# Patient Record
Sex: MALE | Race: WHITE | Employment: STUDENT | URBAN - METROPOLITAN AREA
[De-identification: names, ages, dates, MRNs, and addresses within clinical notes are randomized per-mention and may not be internally consistent; named-entity substitution may affect disease eponyms.]

---
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
## 2018-01-01 ENCOUNTER — HOSPITAL ENCOUNTER (OUTPATIENT)
Dept: OTHER | Age: 18
Discharge: HOME | End: 2018-01-31
Attending: EMERGENCY MEDICINE | Admitting: EMERGENCY MEDICINE

## 2018-01-01 ENCOUNTER — HOSPITAL ENCOUNTER (OUTPATIENT)
Dept: PHYSICAL THERAPY | Age: 18
Discharge: HOME | End: 2018-01-31
Attending: ORTHOPAEDIC SURGERY | Admitting: ORTHOPAEDIC SURGERY

## 2018-01-18 ENCOUNTER — OFFICE VISIT (OUTPATIENT)
Dept: ORTHOPEDIC SURGERY | Age: 18
End: 2018-01-18

## 2018-01-18 VITALS — HEIGHT: 71 IN | WEIGHT: 169.97 LBS | BODY MASS INDEX: 23.8 KG/M2

## 2018-01-18 DIAGNOSIS — S53.442A TEAR OF ULNAR COLLATERAL LIGAMENT OF LEFT ELBOW, INITIAL ENCOUNTER: Primary | ICD-10-CM

## 2018-01-18 PROCEDURE — G8427 DOCREV CUR MEDS BY ELIG CLIN: HCPCS | Performed by: ORTHOPAEDIC SURGERY

## 2018-01-18 PROCEDURE — 1036F TOBACCO NON-USER: CPT | Performed by: ORTHOPAEDIC SURGERY

## 2018-01-18 PROCEDURE — G8484 FLU IMMUNIZE NO ADMIN: HCPCS | Performed by: ORTHOPAEDIC SURGERY

## 2018-01-18 PROCEDURE — 99212 OFFICE O/P EST SF 10 MIN: CPT | Performed by: ORTHOPAEDIC SURGERY

## 2018-01-18 PROCEDURE — G8420 CALC BMI NORM PARAMETERS: HCPCS | Performed by: ORTHOPAEDIC SURGERY

## 2018-01-18 NOTE — PROGRESS NOTES
injury. Injuries to the left elbow including ulnar collateral ligament proximal tear with microfracture capitellum. Treatment has been conservative with hinged elbow brace followed by progressive range of motion and strengthening activities. Since his last visit, he has been out of the brace, performing light strengthening with band work and avoiding  valgus stress to his elbow while strengthening. He has been performing baseball activities including swinging a bat as well as [place] without any symptoms. He states that he had some occasional tightness in his proximal forearm after swinging a bat several times and he has been doing stretches both pre-and post-activity which have improved his symptoms. 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 Review of Systems  Pertinent items are noted in HPI  Review of systems reviewed from Patient History Form dated on 11/9/17 and available in the patient's chart under the Media tab. Vital Signs  There were no vitals filed for this visit. Physical Exam  Constitutional:  Patient is well-nourished and demonstrates normal hygiene. Mental Status:  Patient is alert and oriented to person, place and time. Skin:  Intact, no rashes or lesions. Left Elbow/left upper extremity Examination  Inspection:   no elbow swelling including medial elbow  No skin lesions or open wounds  No erythema or fluctuance  No obvious deformity of left elbow, wrist or upper extremity     Palpation:  Nontender to palpation left wrist, stable distal radioulnar joint in pronation, supination and neutral position  No tenderness to palpation flexor pronator mass and medial epicondyle.   No tenderness just anterior to medial epicondyle  No tenderness radiocapitellar joint  Nontender throughout remainder of left elbow, left forearm and left upper extremity     Range of Motion:  Smooth range of motion without crepitus left elbow 0-140° with no pain athroughout range of motion  Painless wrist flexion and extension, pronation and supination with symmetrical range of motion compared with contralateral wrist  Full composite fist with full extension of all fingers including thumb     Strength:  5 out of 5 EPL, FPL, FDS, FDP, EDC, interossei  5 out of 5 wrist flexion and extension  5 out of 5 elbow flexion and extension     Special Tests:   no Pain with valgus stress along medial elbow joint line throughout range of motion  Negative milking maneuver left elbow with no pain medially  No gross instability with varus or valgus stress of elbow, no posterior lateral instability  No crepitus, catching or locking throughout elbow range of motion    Capillary refill brisk all fingers, symmetric  Gross sensation intact to light touch median/ulnar/radial nerves  Sensation intact to radial/ulnar aspect of fingertip        Radiology:    X-rays obtained and reviewed in office:  No new images obtained today    Additional Diagnostic Test Findings:    MRI left elbow without contrast 11/8/2017:  1. Full-thickness tear proximal anterior band ulnar collateral ligament. The tear extends through the medial capsule. Associated medial swelling and flexor pronator group strain. No muscle tear. No Te no osseous avulsion. 2. Impaction microfracturing humeral capitellum. No macro fracture. See media tab for full details of report, images personally reviewed by me and agree with above      Office Procedures:  No orders of the defined types were placed in this encounter. Kimberlee Lemus MD  Orthopaedic Surgeon, [address]    Contact Information:  Bertha Sink: [phone]  Clinical )    This dictation was performed with a verbal recognition program [facility] [employer] [facility]) and it was checked for errors. It is possible that there are still dictated errors within this office note.   If so, please bring

## 2018-06-13 ENCOUNTER — OFFICE VISIT (OUTPATIENT)
Dept: ORTHOPEDIC SURGERY | Age: 18
End: 2018-06-13

## 2018-06-13 VITALS
BODY MASS INDEX: 24.2 KG/M2 | SYSTOLIC BLOOD PRESSURE: 112 MMHG | HEART RATE: 83 BPM | WEIGHT: 169 LBS | DIASTOLIC BLOOD PRESSURE: 64 MMHG | HEIGHT: 70 IN

## 2018-06-13 DIAGNOSIS — M25.512 PAIN IN JOINT OF LEFT SHOULDER REGION: Primary | ICD-10-CM

## 2018-06-13 PROCEDURE — G8420 CALC BMI NORM PARAMETERS: HCPCS | Performed by: ORTHOPAEDIC SURGERY

## 2018-06-13 PROCEDURE — 99214 OFFICE O/P EST MOD 30 MIN: CPT | Performed by: ORTHOPAEDIC SURGERY

## 2018-06-13 PROCEDURE — G8427 DOCREV CUR MEDS BY ELIG CLIN: HCPCS | Performed by: ORTHOPAEDIC SURGERY

## 2018-06-13 PROCEDURE — 1036F TOBACCO NON-USER: CPT | Performed by: ORTHOPAEDIC SURGERY

## 2018-08-08 ENCOUNTER — OFFICE VISIT (OUTPATIENT)
Dept: ORTHOPEDIC SURGERY | Age: 18
End: 2018-08-08

## 2018-08-08 VITALS — HEART RATE: 75 BPM | SYSTOLIC BLOOD PRESSURE: 107 MMHG | DIASTOLIC BLOOD PRESSURE: 72 MMHG

## 2018-08-08 DIAGNOSIS — M79.671 FOOT PAIN, RIGHT: Primary | ICD-10-CM

## 2018-08-08 DIAGNOSIS — S92.811A CLOSED FRACTURE OF SESAMOID BONE OF RIGHT FOOT, INITIAL ENCOUNTER: ICD-10-CM

## 2018-08-08 DIAGNOSIS — M84.374A STRESS FRACTURE OF METATARSAL BONE OF RIGHT FOOT, INITIAL ENCOUNTER: ICD-10-CM

## 2018-08-08 PROCEDURE — G8427 DOCREV CUR MEDS BY ELIG CLIN: HCPCS | Performed by: ORTHOPAEDIC SURGERY

## 2018-08-08 PROCEDURE — 1036F TOBACCO NON-USER: CPT | Performed by: ORTHOPAEDIC SURGERY

## 2018-08-08 PROCEDURE — G8420 CALC BMI NORM PARAMETERS: HCPCS | Performed by: ORTHOPAEDIC SURGERY

## 2018-08-08 PROCEDURE — 99214 OFFICE O/P EST MOD 30 MIN: CPT | Performed by: ORTHOPAEDIC SURGERY

## 2018-08-08 NOTE — PROGRESS NOTES
Chief Complaint    New Patient (R Foot)      History of Present Illness:  Teagan Curtis is a 19 y.o. male who is here in consultation at the request of Dr. Ragini De La Cruz for evaluation chief complaint of right forefoot pain. He is a [employer]  who was injured on May 6, 2018 when he jammed his great toe in the outfield. On 5/10/18 he underwent an MRI scan that showed a medial tibial sesamoid fracture versus a bipartite sesamoid with bone marrow edema. He continued to play although he used a walking boot when he was not playing. He was able to complete the season which was approximately 3 weeks later. He did improve somewhat and then began summer baseball. He played 8 games and states that anytime he would move side to side or push off going forward he would have pain in his right foot over the 1st MTP joint. He had this taped and continued to have pain including through his forefoot. A 2nd MRI was obtained on July 20, 2018 and he was found to have a stress fracture of the 3rd metatarsal shaft high-grade stress reaction in the 4th metatarsal shaft and now a comminuted medial tibial sesamoid fracture versus a multipartite sesamoid with sesamoid inflammation and possible arthrosis. It was felt that there was no significant change from 5/10/18 as far as his medial sesamoid was concerned. He is here with his  from [facility] and his mother. Medical History:  Patient's medications, allergies, past medical, surgical, social and family histories were reviewed and updated as appropriate. Review of Systems:  Pertinent items are noted in HPI  Review of systems reviewed from Patient History Form dated on August 8, 2018 and available in the patient's chart under the Media tab. Vital Signs:  /72   Pulse 75     General Exam:   Constitutional: Patient is adequately groomed with no evidence of malnutrition  DTRs: Deep tendon reflexes are intact  Mental Status:  The Treatment Plan:  I spent 30 minutes with this patient his mother and college  greater than 50% of the time face-to-face discussing treatment options. I would recommend that he stay nonweightbearing in his high tide boot. He'll continue to use his bone stimulator take a multivitamin use a rollabout as necessary and follow up with me in 3 weeks. Repeat x-rays of both feet should be obtained to look for bipartite or tripartite sesamoid on the opposite side. I would also get sesamoid views on the right side only if he is doing well I'll let him gradually increase his weightbearing and begin crosstraining. If he is slow to heal than I would continue with his boot nonweightbearing. I will more than likely repeat his MRI in the future to evaluate interval change.   I did update Dr. Diamante Yi on his status

## 2018-08-29 ENCOUNTER — OFFICE VISIT (OUTPATIENT)
Dept: ORTHOPEDIC SURGERY | Age: 18
End: 2018-08-29

## 2018-08-29 VITALS
HEIGHT: 71 IN | HEART RATE: 80 BPM | BODY MASS INDEX: 24.5 KG/M2 | SYSTOLIC BLOOD PRESSURE: 124 MMHG | DIASTOLIC BLOOD PRESSURE: 66 MMHG | WEIGHT: 175 LBS

## 2018-08-29 DIAGNOSIS — S92.811A CLOSED FRACTURE OF SESAMOID BONE OF RIGHT FOOT, INITIAL ENCOUNTER: ICD-10-CM

## 2018-08-29 DIAGNOSIS — M79.672 FOOT PAIN, BILATERAL: Primary | ICD-10-CM

## 2018-08-29 DIAGNOSIS — M79.671 FOOT PAIN, BILATERAL: Primary | ICD-10-CM

## 2018-08-29 DIAGNOSIS — M84.374A STRESS FRACTURE OF METATARSAL BONE OF RIGHT FOOT, INITIAL ENCOUNTER: ICD-10-CM

## 2018-08-29 PROCEDURE — 99212 OFFICE O/P EST SF 10 MIN: CPT | Performed by: ORTHOPAEDIC SURGERY

## 2018-08-29 PROCEDURE — 1036F TOBACCO NON-USER: CPT | Performed by: ORTHOPAEDIC SURGERY

## 2018-08-29 PROCEDURE — G8427 DOCREV CUR MEDS BY ELIG CLIN: HCPCS | Performed by: ORTHOPAEDIC SURGERY

## 2018-08-29 PROCEDURE — G8420 CALC BMI NORM PARAMETERS: HCPCS | Performed by: ORTHOPAEDIC SURGERY

## 2018-08-29 NOTE — PROGRESS NOTES
Subjective: Patient states that he is here for follow-up of his right foot medial sesamoid fracture and a college [employer] . See her with his  and his mother. He states he has no pain is been doing well on his rollabout. He has not put any weight on this  Objective: Physical exam shows no significant swelling erythema or ecchymosis. He has no pain with distraction of the 1st MTP joint on the right side. Mild tenderness with varus stress through the right 1st MTP joint. No tenderness over the medial sesamoid. Strength is at least 4/5 in ankle dorsiflexion plantarflexion  Imaging: 3 views of both feet including a sesamoid view of the right foot shows that the left foot has no evidence of bipartite or tripartite sesamoid. The right foot shows some disuse osteopenia no fragmentation of the medial sesamoid appears to be healing in  Assessment and plan: I would recommend at this point that he gradually started into weightbearing using the alter G at his school. I discussed with the  that we would increase his weightbearing by 25% every 3-5 days. This is for walking only and no high impact. I'll see him back in 3 weeks repeat x-rays of the right foot only including sesamoid views. If he continues to improve we'll see about letting him gradually increase his impact activities. He can swim and bike now. I also gave him a prescription for custom inserts that'll unload the sesamoid and in the future he may benefit from a smaller custom insert for his athletic shoes.   I will also obtain a MRI scan before I let him go back to full activity

## 2018-09-01 ENCOUNTER — HOSPITAL ENCOUNTER (OUTPATIENT)
Dept: OTHER | Age: 18
Discharge: HOME | End: 2018-09-02
Attending: ORTHOPAEDIC SURGERY | Admitting: ORTHOPAEDIC SURGERY

## 2018-09-04 ENCOUNTER — HOSPITAL ENCOUNTER (OUTPATIENT)
Dept: PHYSICAL THERAPY | Age: 18
Discharge: HOME | End: 2018-09-05
Admitting: ORTHOPAEDIC SURGERY

## 2018-09-04 NOTE — PLAN OF CARE
[x] Patient history, allergies, meds reviewed. Medical chart reviewed. See intake form. Review Of Systems (ROS):  [x]Performed Review of systems (Integumentary, CardioPulmonary, Neurological) by intake and observation. Intake form has been scanned into medical record. Patient has been instructed to contact their primary care physician regarding ROS issues if not already being addressed at this time. Co-morbidities/Complexities (which will affect course of rehabilitation):    [x]None           Arthritic conditions   []Rheumatoid arthritis (M05.9)  []Osteoarthritis (M19.91)   Cardiovascular conditions   []Hypertension (I10)  []Hyperlipidemia (E78.5)  []Angina pectoris (I20)  []Atherosclerosis (I70)  []CVA Musculoskeletal conditions   []Disc pathology   []Congenital spine pathologies   []Prior surgical intervention  []Osteoporosis (M81.8)  []Osteopenia (M85.8)   Endocrine conditions   []Hypothyroid (E03.9)  []Hyperthyroid Gastrointestinal conditions   []Constipation (O90.66)   Metabolic conditions   []Morbid obesity (E66.01)  []Diabetes type 1(E10.65) or 2 (E11.65)   []Neuropathy (G60.9)     Pulmonary conditions   []Asthma (J45)  []Coughing   []COPD (J44.9)   Psychological Disorders  []Anxiety (F41.9)  []Depression (F32.9)   []Other:   []Other:          Barriers to/and or personal factors that will affect rehab potential:              []Age  []Sex    []Smoker              []Motivation/Lack of Motivation                        []Co-Morbidities              []Cognitive Function, education/learning barriers              []Environmental, home barriers              []profession/work barriers  []past PT/medical experience  []other:  Justification:      Falls Risk Assessment (30 days):    [x] Falls Risk assessed and no intervention required.   [] Falls Risk assessed and Patient requires intervention due to being higher risk   TUG score (>12s at risk):     [] Falls education provided, including       G-Codes:

## 2018-09-04 NOTE — FLOWSHEET NOTE
Cybex HS curl      TKE      Glute side walks      RDL      Slide Lunge      Slide HS eccentrics      Step ups/ecc step down      Swissball wall rolls- in SLS- hip drive      Quad hip ext/wall-ball rolls                  Manual Intervention      Knee mobs/PROM      Tib/Fem Mobs      Patella Mobs      Ankle mobs                  NMR re-education      Cymraes/Biofeedback 10/10      BFR      G. Med activation/sidelying      G. Max Activation/prone      Hip Ext full ROM G. Activation      Bosu Bal and Prop- G Med      Single leg stance/Balance/Prop      Bosu Retro G. Med act      Prone Hip froggers- sliders/elevated                Therapeutic Exercise and NMR EXR  [x] (20134) Provided verbal/tactile cueing for activities related to strengthening, flexibility, endurance, ROM for improvements in LE, proximal hip, and core control with self care, mobility, lifting, ambulation. [x] (55820) Provided verbal/tactile cueing for activities related to improving balance, coordination, kinesthetic sense, posture, motor skill, proprioception  to assist with LE, proximal hip, and core control in self care, mobility, lifting, ambulation and eccentric single leg control.      NMR and Therapeutic Activities:    [] (39503 or 13886) Provided verbal/tactile cueing for activities related to improving balance, coordination, kinesthetic sense, posture, motor skill, proprioception and motor activation to allow for proper function of core, proximal hip and LE with self care and ADLs  [] (80713) Gait Re-education- Provided training and instruction to the patient for proper LE, core and proximal hip recruitment and positioning and eccentric body weight control with ambulation re-education including up and down stairs     Home Exercise Program:    [x] (57482) Reviewed/Progressed HEP activities related to strengthening, flexibility, endurance, ROM of core, proximal hip and LE for functional self-care, mobility, lifting and ambulation/stair navigation   [] (50547)Reviewed/Progressed HEP activities related to improving balance, coordination, kinesthetic sense, posture, motor skill, proprioception of core, proximal hip and LE for self care, mobility, lifting, and ambulation/stair navigation      Manual Treatments:  PROM / STM / Oscillations-Mobs:  G-I, II, III, IV (PA's, Inf., Post.)  [] (90611) Provided manual therapy to mobilize LE, proximal hip and/or LS spine soft tissue/joints for the purpose of modulating pain, promoting relaxation,  increasing ROM, reducing/eliminating soft tissue swelling/inflammation/restriction, improving soft tissue extensibility and allowing for proper ROM for normal function with self care, mobility, lifting and ambulation. Modalities:      Charges:  Timed Code Treatment Minutes: 15   Total Treatment Minutes: 50     [x] EVAL (LOW) 26483 (typically 20 minutes face-to-face)  [] EVAL (MOD) 36534 (typically 30 minutes face-to-face)  [] EVAL (HIGH) 91915 (typically 45 minutes face-to-face)  [] RE-EVAL     [x] DY(98369) x      [] IONTO  [] NMR (23690) x      [] VASO  [] Manual (70198) x       [] Other:  [] TA x       [] Mech Traction (60756)  [] ES(attended) (04764)      [] ES (un) (37637):     GOALS:     GOALS:  Patient stated goal: return to running    Therapist goals for Patient:   Short Term Goals: To be achieved in: 2 weeks  1. Independent in HEP and progression per patient tolerance, in order to prevent re-injury. 2. Patient will have a decrease in pain to facilitate improvement in movement, function, and ADLs as indicated by Functional Deficits. Long Term Goals: To be achieved in: 4 weeks  1. Disability index score of 70/80% or more for the LEFS to assist with reaching prior level of function. 2. Patient will demonstrate increased AROM to [facility] to allow for proper joint functioning as indicated by patients Functional Deficits.    3. Patient will demonstrate an increase in Strength to good proximal hip strength and control, within 5lb HHD in LE to allow for proper functional mobility as indicated by patients Functional Deficits. 4. Patient will return to walking functional activities without increased symptoms or restriction. 5. Light jogging without pain (patient specific functional goal)      Progression Towards Functional goals:  [] Patient is progressing as expected towards functional goals listed. [] Progression is slowed due to complexities listed. [] Progression has been slowed due to co-morbidities.   [x] Plan just implemented, too soon to assess goals progression  [] Other:     ASSESSMENT:  See eval    Return to Play: (if applicable)   []  Stage 1: Intro to Strength   []  Stage 2: Return to Run and Strength   []  Stage 3: Return to Jump and Strength   []  Stage 4: Dynamic Strength and Agility   []  Stage 5: Sport Specific Training     []  Ready to Return to Play, Meets All Above Stages   []  Not Ready for Return to Sports   Comments:                         Treatment/Activity Tolerance:  [x] Patient tolerated treatment well [] Patient limited by fatique  [] Patient limited by pain  [] Patient limited by other medical complications  [] Other:     Prognosis: [x] Good [] Fair  [] Poor    Patient Requires Follow-up: [x] Yes  [] No      PLAN: See eval  [] Continue per plan of care [] Alter current plan (see comments)  [x] Plan of care initiated [] Hold pending MD visit [] Discharge    Electronically signed by: Gerald Burris PT

## 2018-09-06 ENCOUNTER — HOSPITAL ENCOUNTER (OUTPATIENT)
Dept: PHYSICAL THERAPY | Age: 18
Discharge: HOME | End: 2018-09-07
Admitting: ORTHOPAEDIC SURGERY

## 2018-09-06 NOTE — FLOWSHEET NOTE
LE for functional self-care, mobility, lifting and ambulation/stair navigation   [] (00113)Reviewed/Progressed HEP activities related to improving balance, coordination, kinesthetic sense, posture, motor skill, proprioception of core, proximal hip and LE for self care, mobility, lifting, and ambulation/stair navigation      Manual Treatments:  PROM / STM / Oscillations-Mobs:  G-I, II, III, IV (PA's, Inf., Post.)  [] (17611) Provided manual therapy to mobilize LE, proximal hip and/or LS spine soft tissue/joints for the purpose of modulating pain, promoting relaxation,  increasing ROM, reducing/eliminating soft tissue swelling/inflammation/restriction, improving soft tissue extensibility and allowing for proper ROM for normal function with self care, mobility, lifting and ambulation. Modalities:      Charges:  Timed Code Treatment Minutes: 35   Total Treatment Minutes: 35     [] EVAL (LOW) 76336 (typically 20 minutes face-to-face)  [] EVAL (MOD) 60918 (typically 30 minutes face-to-face)  [] EVAL (HIGH) 01742 (typically 45 minutes face-to-face)  [] RE-EVAL     [x] PL(75116) x      [] IONTO  [] NMR (12172) x      [] VASO  [x] Manual (79972) x       [] Other:  [] TA x       [] Mech Traction (82515)  [] ES(attended) (82002)      [] ES (un) (52443):     GOALS:     GOALS:  Patient stated goal: return to running    Therapist goals for Patient:   Short Term Goals: To be achieved in: 2 weeks  1. Independent in HEP and progression per patient tolerance, in order to prevent re-injury. 2. Patient will have a decrease in pain to facilitate improvement in movement, function, and ADLs as indicated by Functional Deficits. Long Term Goals: To be achieved in: 4 weeks  1. Disability index score of 70/80% or more for the LEFS to assist with reaching prior level of function. 2. Patient will demonstrate increased AROM to [facility] to allow for proper joint functioning as indicated by patients Functional Deficits.    3. Patient will

## 2018-09-11 ENCOUNTER — HOSPITAL ENCOUNTER (OUTPATIENT)
Dept: PHYSICAL THERAPY | Age: 18
Discharge: HOME | End: 2018-09-12
Admitting: ORTHOPAEDIC SURGERY

## 2018-09-11 NOTE — FLOWSHEET NOTE
[address] [place] [address]  Phone: [phone] Fax: [phone]    Physical Therapy Daily Treatment Note  Date:  2018    Patient Name:  Charlee Zapien    :  2000  MRN: [id number]  Restrictions/Precautions:    Medical/Treatment Diagnosis Information:  · Diagnosis: M79.671, M79.672 (ICD-10-CM) - Foot pain, bilateral S92.811A (ICD-10-CM) - Closed fracture of sesamoid bone of right foot, initial encounter  · Treatment Diagnosis: M79.671, M79.672 (ICD-10-CM) - Foot pain, bilateral S92.811A (ICD-10-CM) - Closed fracture of sesamoid bone of right foot, initial encounter  Insurance/Certification information:     Physician Information:  Referring Practitioner: Dr Paula Ramesh of care signed (Y/N):     Date of Patient follow up with Physician:     G-Code (if applicable):      Date G-Code Applied:    PT G-Codes  Functional Assessment Tool Used: LEFS  Score: 34/80  Functional Limitation: Mobility: Walking and moving around  Mobility: Walking and Moving Around Current Status (): At least 40 percent but less than 60 percent impaired, limited or restricted  Mobility: Walking and Moving Around Goal Status (): At least 1 percent but less than 20 percent impaired, limited or restricted    Progress Note: [x]  Yes  []  No  Next due by: Visit #10       Latex Allergy:  [x]NO      []YES  Preferred Language for Healthcare:   [x]English       []other:    Visit # Insurance Allowable   3 12     Pain level:  0-2/10     SUBJECTIVE:  Patient denies any pain after walking on TM. Feeling good. Minor fatigue at the end of the day in foot. OBJECTIVE: See eval  Observation:   Test measurements:      RESTRICTIONS/PRECAUTIONS: R sesamoid fx, 3,4 stress fx    Exercises/Interventions:     Therapeutic Ex Sets/sec Reps Notes   Retro Stepper/BIKE 10 min     Alter G 20 min 1. 2mph 60% WB   BFR      [employer] March      3 way SLR      SAQ Clam ABD      Hip Ext Wyatt Nanas      BOSU fwd/side lunge      BOSU squat      Leg Press Iso/Con/Ecc 0-      Cybex HS curl      TKE      Glute side walks      RDL      Slide Lunge      Slide HS eccentrics      Step ups/ecc step down      Swissball wall rolls- in SLS- hip drive      Quad hip ext/wall-ball rolls                  Manual Intervention      Knee mobs/PROM      Tib/Fem Mobs      Patella Mobs      Ankle mobs      Great toe mobs 12 min           NMR re-education      [place]/Biofeedback 10/10      BFR      G. Med activation/sidelying      G. Max Activation/prone      Hip Ext full ROM G. Activation      Bosu Bal and Prop- G Med      Single leg stance/Balance/Prop      Bosu Retro G. Med act      Prone Hip froggers- sliders/elevated                Therapeutic Exercise and NMR EXR  [x] (48905) Provided verbal/tactile cueing for activities related to strengthening, flexibility, endurance, ROM for improvements in LE, proximal hip, and core control with self care, mobility, lifting, ambulation. [x] (37695) Provided verbal/tactile cueing for activities related to improving balance, coordination, kinesthetic sense, posture, motor skill, proprioception  to assist with LE, proximal hip, and core control in self care, mobility, lifting, ambulation and eccentric single leg control.      NMR and Therapeutic Activities:    [] (85358 or 05653) Provided verbal/tactile cueing for activities related to improving balance, coordination, kinesthetic sense, posture, motor skill, proprioception and motor activation to allow for proper function of core, proximal hip and LE with self care and ADLs  [] (85705) Gait Re-education- Provided training and instruction to the patient for proper LE, core and proximal hip recruitment and positioning and eccentric body weight control with ambulation re-education including up and down stairs     Home Exercise Program:    [x] (73339) Reviewed/Progressed HEP activities related to strengthening, flexibility, endurance, ROM of core, proximal hip and LE for functional self-care, mobility, lifting and ambulation/stair navigation   [] (38536)Reviewed/Progressed HEP activities related to improving balance, coordination, kinesthetic sense, posture, motor skill, proprioception of core, proximal hip and LE for self care, mobility, lifting, and ambulation/stair navigation      Manual Treatments:  PROM / STM / Oscillations-Mobs:  G-I, II, III, IV (PA's, Inf., Post.)  [] (56803) Provided manual therapy to mobilize LE, proximal hip and/or LS spine soft tissue/joints for the purpose of modulating pain, promoting relaxation,  increasing ROM, reducing/eliminating soft tissue swelling/inflammation/restriction, improving soft tissue extensibility and allowing for proper ROM for normal function with self care, mobility, lifting and ambulation. Modalities:      Charges:  Timed Code Treatment Minutes: 40   Total Treatment Minutes: 40     [] EVAL (LOW) 77200 (typically 20 minutes face-to-face)  [] EVAL (MOD) 13972 (typically 30 minutes face-to-face)  [] EVAL (HIGH) 86108 (typically 45 minutes face-to-face)  [] RE-EVAL     [x] DJ(08733) x  2   [] IONTO  [] NMR (51801) x      [] VASO  [x] Manual (53197) x       [] Other:  [] TA x       [] Mech Traction (90226)  [] ES(attended) (47860)      [] ES (un) (05174):     GOALS:     GOALS:  Patient stated goal: return to running    Therapist goals for Patient:   Short Term Goals: To be achieved in: 2 weeks  1. Independent in HEP and progression per patient tolerance, in order to prevent re-injury. 2. Patient will have a decrease in pain to facilitate improvement in movement, function, and ADLs as indicated by Functional Deficits. Long Term Goals: To be achieved in: 4 weeks  1. Disability index score of 70/80% or more for the LEFS to assist with reaching prior level of function.    2. Patient will demonstrate increased AROM to [facility] to allow for proper joint functioning as indicated

## 2018-09-13 ENCOUNTER — HOSPITAL ENCOUNTER (OUTPATIENT)
Dept: PHYSICAL THERAPY | Age: 18
Discharge: HOME | End: 2018-09-14
Admitting: ORTHOPAEDIC SURGERY

## 2018-09-13 NOTE — FLOWSHEET NOTE
[address] [place] [address]  Phone: [phone] Fax: [phone]    Physical Therapy Daily Treatment Note  Date:  2018    Patient Name:  Andrew Gruber    :  2000  MRN: [id number]  Restrictions/Precautions:    Medical/Treatment Diagnosis Information:  · Diagnosis: M79.671, M79.672 (ICD-10-CM) - Foot pain, bilateral S92.811A (ICD-10-CM) - Closed fracture of sesamoid bone of right foot, initial encounter  · Treatment Diagnosis: M79.671, M79.672 (ICD-10-CM) - Foot pain, bilateral S92.811A (ICD-10-CM) - Closed fracture of sesamoid bone of right foot, initial encounter  Insurance/Certification information:     Physician Information:  Referring Practitioner: Dr Abi Rucker of care signed (Y/N):     Date of Patient follow up with Physician:     G-Code (if applicable):      Date G-Code Applied:    PT G-Codes  Functional Assessment Tool Used: LEFS  Score: 34/80  Functional Limitation: Mobility: Walking and moving around  Mobility: Walking and Moving Around Current Status (): At least 40 percent but less than 60 percent impaired, limited or restricted  Mobility: Walking and Moving Around Goal Status (): At least 1 percent but less than 20 percent impaired, limited or restricted    Progress Note: [x]  Yes  []  No  Next due by: Visit #10       Latex Allergy:  [x]NO      []YES  Preferred Language for Healthcare:   [x]English       []other:    Visit # Insurance Allowable   4 12     Pain level:  0-2/10     SUBJECTIVE:  Patient denies any pain after walking on TM, no pain and progressing well. OBJECTIVE: See eval  Observation:   Test measurements:      RESTRICTIONS/PRECAUTIONS: R sesamoid fx, 3,4 stress fx    Exercises/Interventions:     Therapeutic Ex Sets/sec Reps Notes   Retro Stepper/BIKE 10 min     Alter G 20 min 1. 7mph 75% WB   BFR      Hip abd  2 12 4 lb   Seated gastroc 2 20 blk   SAQ      Clam ABD      Hip Deficits. 3. Patient will demonstrate an increase in Strength to good proximal hip strength and control, within 5lb HHD in LE to allow for proper functional mobility as indicated by patients Functional Deficits. 4. Patient will return to walking functional activities without increased symptoms or restriction. 5. Light jogging without pain (patient specific functional goal)      Progression Towards Functional goals:  [] Patient is progressing as expected towards functional goals listed. [] Progression is slowed due to complexities listed. [] Progression has been slowed due to co-morbidities. [x] Plan just implemented, too soon to assess goals progression  [] Other:     ASSESSMENT:  Patient tolerated well. No pain with gait. Improved toe extension. Continue to progress loading as anais.     Return to Play: (if applicable)   []  Stage 1: Intro to Strength   []  Stage 2: Return to Run and Strength   []  Stage 3: Return to Jump and Strength   []  Stage 4: Dynamic Strength and Agility   []  Stage 5: Sport Specific Training     []  Ready to Return to Play, Meets All Above Stages   []  Not Ready for Return to Sports   Comments:                         Treatment/Activity Tolerance:  [x] Patient tolerated treatment well [] Patient limited by fatique  [] Patient limited by pain  [] Patient limited by other medical complications  [] Other:     Prognosis: [x] Good [] Fair  [] Poor    Patient Requires Follow-up: [x] Yes  [] No      PLAN: See eval  [] Continue per plan of care [] Alter current plan (see comments)  [x] Plan of care initiated [] Hold pending MD visit [] Discharge    Electronically signed by: Maribell Arechiga PT

## 2018-09-18 ENCOUNTER — HOSPITAL ENCOUNTER (OUTPATIENT)
Dept: PHYSICAL THERAPY | Age: 18
Discharge: HOME | End: 2018-09-19
Admitting: ORTHOPAEDIC SURGERY

## 2018-09-18 NOTE — FLOWSHEET NOTE
[address] [place] [address] [place] [address]  Phone: [phone] Fax: [phone]    Physical Therapy Daily Treatment Note  Date:  2018    Patient Name:  Fredi Soria    :  2000  MRN: [id number]  Restrictions/Precautions:    Medical/Treatment Diagnosis Information:  · Diagnosis: M79.671, M79.672 (ICD-10-CM) - Foot pain, bilateral S92.811A (ICD-10-CM) - Closed fracture of sesamoid bone of right foot, initial encounter  · Treatment Diagnosis: M79.671, M79.672 (ICD-10-CM) - Foot pain, bilateral S92.811A (ICD-10-CM) - Closed fracture of sesamoid bone of right foot, initial encounter  Insurance/Certification information:     Physician Information:  Referring Practitioner: Dr Elías Hargrove of care signed (Y/N):     Date of Patient follow up with Physician:     G-Code (if applicable):      Date G-Code Applied:    PT G-Codes  Functional Assessment Tool Used: LEFS  Score: 34/80  Functional Limitation: Mobility: Walking and moving around  Mobility: Walking and Moving Around Current Status (): At least 40 percent but less than 60 percent impaired, limited or restricted  Mobility: Walking and Moving Around Goal Status (): At least 1 percent but less than 20 percent impaired, limited or restricted    Progress Note: [x]  Yes  []  No  Next due by: Visit #10       Latex Allergy:  [x]NO      []YES  Preferred Language for Healthcare:   [x]English       []other:    Visit # Insurance Allowable   5  12     Pain level:  0/10     SUBJECTIVE:  Patient denies any pain after walking on TM, no pain and progressing well. Walking with 1 crutch with no pain. Good progress     OBJECTIVE:   Observation:   Test measurements:  DF WNL, Great toe active ext 60 deg    RESTRICTIONS/PRECAUTIONS: R sesamoid fx, 3,4 stress fx    Exercises/Interventions:     Therapeutic Ex Sets/sec Reps Notes   Retro Stepper/BIKE 10 min     Alter G 20 min 2. 7mph 90% WB   BFR AROM to [facility] to allow for proper joint functioning as indicated by patients Functional Deficits. 3. Patient will demonstrate an increase in Strength to good proximal hip strength and control, within 5lb HHD in LE to allow for proper functional mobility as indicated by patients Functional Deficits. 4. Patient will return to walking functional activities without increased symptoms or restriction. 5. Light jogging without pain (patient specific functional goal)      Progression Towards Functional goals:  [] Patient is progressing as expected towards functional goals listed. [] Progression is slowed due to complexities listed. [] Progression has been slowed due to co-morbidities. [x] Plan just implemented, too soon to assess goals progression  [] Other:     ASSESSMENT:  Patient tolerated well. No pain with gait. Improved toe extension. Continue to progress loading as anais.   Progress as anais based on WB status    Return to Play: (if applicable)   []  Stage 1: Intro to Strength   []  Stage 2: Return to Run and Strength   []  Stage 3: Return to Jump and Strength   []  Stage 4: Dynamic Strength and Agility   []  Stage 5: Sport Specific Training     []  Ready to Return to Play, Meets All Above Stages   []  Not Ready for Return to Sports   Comments:                         Treatment/Activity Tolerance:  [x] Patient tolerated treatment well [] Patient limited by fatique  [] Patient limited by pain  [] Patient limited by other medical complications  [] Other:     Prognosis: [x] Good [] Fair  [] Poor    Patient Requires Follow-up: [x] Yes  [] No      PLAN: See eval  [] Continue per plan of care [] Alter current plan (see comments)  [x] Plan of care initiated [] Hold pending MD visit [] Discharge    Electronically signed by: Dillon Tello PT

## 2018-09-20 ENCOUNTER — OFFICE VISIT (OUTPATIENT)
Dept: ORTHOPEDIC SURGERY | Age: 18
End: 2018-09-20

## 2018-09-20 VITALS
SYSTOLIC BLOOD PRESSURE: 116 MMHG | HEIGHT: 71 IN | HEART RATE: 75 BPM | DIASTOLIC BLOOD PRESSURE: 67 MMHG | WEIGHT: 175 LBS | BODY MASS INDEX: 24.5 KG/M2

## 2018-09-20 DIAGNOSIS — M79.671 FOOT PAIN, RIGHT: Primary | ICD-10-CM

## 2018-09-20 PROCEDURE — G8420 CALC BMI NORM PARAMETERS: HCPCS | Performed by: ORTHOPAEDIC SURGERY

## 2018-09-20 PROCEDURE — 99212 OFFICE O/P EST SF 10 MIN: CPT | Performed by: ORTHOPAEDIC SURGERY

## 2018-09-20 PROCEDURE — 1036F TOBACCO NON-USER: CPT | Performed by: ORTHOPAEDIC SURGERY

## 2018-09-20 PROCEDURE — G8427 DOCREV CUR MEDS BY ELIG CLIN: HCPCS | Performed by: ORTHOPAEDIC SURGERY

## 2018-10-11 ENCOUNTER — HOSPITAL ENCOUNTER (OUTPATIENT)
Dept: PHYSICAL THERAPY | Age: 18
Setting detail: THERAPIES SERIES
Discharge: HOME | End: 2018-10-11
Admitting: ORTHOPAEDIC SURGERY
Payer: COMMERCIAL

## 2018-10-11 PROCEDURE — 97110 THERAPEUTIC EXERCISES: CPT

## 2018-10-11 PROCEDURE — 97140 MANUAL THERAPY 1/> REGIONS: CPT

## 2018-10-17 ENCOUNTER — OFFICE VISIT (OUTPATIENT)
Dept: ORTHOPEDIC SURGERY | Age: 18
End: 2018-10-17
Payer: COMMERCIAL

## 2018-10-17 VITALS
HEIGHT: 71 IN | BODY MASS INDEX: 24.51 KG/M2 | SYSTOLIC BLOOD PRESSURE: 130 MMHG | WEIGHT: 175.04 LBS | HEART RATE: 70 BPM | DIASTOLIC BLOOD PRESSURE: 87 MMHG

## 2018-10-17 DIAGNOSIS — S92.811D CLOSED FRACTURE OF SESAMOID BONE OF RIGHT FOOT WITH ROUTINE HEALING, SUBSEQUENT ENCOUNTER: ICD-10-CM

## 2018-10-17 DIAGNOSIS — M79.671 FOOT PAIN, RIGHT: Primary | ICD-10-CM

## 2018-10-17 PROCEDURE — G8420 CALC BMI NORM PARAMETERS: HCPCS | Performed by: ORTHOPAEDIC SURGERY

## 2018-10-17 PROCEDURE — G8484 FLU IMMUNIZE NO ADMIN: HCPCS | Performed by: ORTHOPAEDIC SURGERY

## 2018-10-17 PROCEDURE — 1036F TOBACCO NON-USER: CPT | Performed by: ORTHOPAEDIC SURGERY

## 2018-10-17 PROCEDURE — G8427 DOCREV CUR MEDS BY ELIG CLIN: HCPCS | Performed by: ORTHOPAEDIC SURGERY

## 2018-10-17 PROCEDURE — 99212 OFFICE O/P EST SF 10 MIN: CPT | Performed by: ORTHOPAEDIC SURGERY

## 2018-10-18 ENCOUNTER — HOSPITAL ENCOUNTER (OUTPATIENT)
Dept: PHYSICAL THERAPY | Age: 18
Setting detail: THERAPIES SERIES
Discharge: HOME | End: 2018-10-18
Admitting: ORTHOPAEDIC SURGERY
Payer: COMMERCIAL

## 2018-11-15 ENCOUNTER — OFFICE VISIT (OUTPATIENT)
Dept: ORTHOPEDIC SURGERY | Age: 18
End: 2018-11-15
Payer: COMMERCIAL

## 2018-11-15 DIAGNOSIS — M79.671 FOOT PAIN, RIGHT: Primary | ICD-10-CM

## 2018-11-15 DIAGNOSIS — S92.811D CLOSED FRACTURE OF SESAMOID BONE OF RIGHT FOOT WITH ROUTINE HEALING, SUBSEQUENT ENCOUNTER: ICD-10-CM

## 2018-11-15 PROCEDURE — G8420 CALC BMI NORM PARAMETERS: HCPCS | Performed by: ORTHOPAEDIC SURGERY

## 2018-11-15 PROCEDURE — 1036F TOBACCO NON-USER: CPT | Performed by: ORTHOPAEDIC SURGERY

## 2018-11-15 PROCEDURE — 99212 OFFICE O/P EST SF 10 MIN: CPT | Performed by: ORTHOPAEDIC SURGERY

## 2018-11-15 PROCEDURE — G8484 FLU IMMUNIZE NO ADMIN: HCPCS | Performed by: ORTHOPAEDIC SURGERY

## 2018-11-15 PROCEDURE — G8428 CUR MEDS NOT DOCUMENT: HCPCS | Performed by: ORTHOPAEDIC SURGERY

## 2019-01-08 ENCOUNTER — HOSPITAL ENCOUNTER (OUTPATIENT)
Dept: PHYSICAL THERAPY | Age: 19
Setting detail: THERAPIES SERIES
Discharge: HOME | End: 2019-01-08
Admitting: ORTHOPAEDIC SURGERY
Payer: COMMERCIAL

## 2019-06-05 ENCOUNTER — HOSPITAL ENCOUNTER (OUTPATIENT)
Dept: PHYSICAL THERAPY | Age: 19
Setting detail: THERAPIES SERIES
Discharge: HOME | End: 2019-06-05
Payer: COMMERCIAL

## 2019-06-05 PROCEDURE — 97110 THERAPEUTIC EXERCISES: CPT

## 2019-06-05 PROCEDURE — 97161 PT EVAL LOW COMPLEX 20 MIN: CPT

## 2019-06-05 NOTE — FLOWSHEET NOTE
cueing for activities related to strengthening, flexibility, endurance, ROM  for improvements in scapular, scapulothoracic and UE control with self care, reaching, carrying, lifting, house/yardwork, driving/computer work.    [] (20992) Provided verbal/tactile cueing for activities related to improving balance, coordination, kinesthetic sense, posture, motor skill, proprioception  to assist with  scapular, scapulothoracic and UE control with self care, reaching, carrying, lifting, house/yardwork, driving/computer work. Therapeutic Activities:    [] (05316 or 12444) Provided verbal/tactile cueing for activities related to improving balance, coordination, kinesthetic sense, posture, motor skill, proprioception and motor activation to allow for proper function of scapular, scapulothoracic and UE control with self care, carrying, lifting, driving/computer work.      Home Exercise Program:    [x] (95095) Reviewed/Progressed HEP activities related to strengthening, flexibility, endurance, ROM of scapular, scapulothoracic and UE control with self care, reaching, carrying, lifting, house/yardwork, driving/computer work  [] (42475) Reviewed/Progressed HEP activities related to improving balance, coordination, kinesthetic sense, posture, motor skill, proprioception of scapular, scapulothoracic and UE control with self care, reaching, carrying, lifting, house/yardwork, driving/computer work      Manual Treatments:  PROM / STM / Oscillations-Mobs:  G-I, II, III, IV (PA's, Inf., Post.)  [] (30300) Provided manual therapy to mobilize soft tissue/joints of cervical/CT, scapular GHJ and UE for the purpose of modulating pain, promoting relaxation,  increasing ROM, reducing/eliminating soft tissue swelling/inflammation/restriction, improving soft tissue extensibility and allowing for proper ROM for normal function with self care, reaching, carrying, lifting, house/yardwork, driving/computer work    Modalities: declined, has a home e-stim unit     Charges:  Timed Code Treatment Minutes: 30   Total Treatment Minutes: 60     [x] EVAL (LOW) 45069 (typically 20 minutes face-to-face)  [] EVAL (MOD) 18293 (typically 30 minutes face-to-face)  [] EVAL (HIGH) 77779 (typically 45 minutes face-to-face)  [] RE-EVAL     [x] RR(45263) x  2   [] IONTO  [] NMR (61541) x      [] VASO  [] Manual (54555) x       [] Other:  [] TA x       [] Mech Traction (74494)  [] ES(attended) (54653)      [] ES (un) (76263):     Kennedy Jefferson stated goal: Get back to baseball; strengthen R shoulder     Therapist goals for Patient:   Short Term Goals: To be achieved in: 2 weeks  1. Independent in HEP and progression per patient tolerance, in order to prevent re-injury. 2. Patient will have a decrease in pain to facilitate improvement in movement, function, and ADLs as indicated by Functional Deficits.     Long Term Goals: To be achieved in: 4 weeks  1. Disability index score of 11% or less for the [facility] to assist with reaching prior level of function. 2. Patient will demonstrate increased R shoulder functional IR AROM to T4 without pain. 3. Patient will demonstrate an increase in Strength to 4+/5 for the low trap B, 5/5 for the middle trap B, and 5/5 for ER at 90-90 B in order to improve scapular kinesis with UE reaching and throwing motions. 4. Patient will have a negative ulnar ULNTT. 5. 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 Progression Towards Functional goals:  [] Patient is progressing as expected towards functional goals listed. [] Progression is slowed due to complexities listed. [] Progression has been slowed due to co-morbidities.   [x] Plan just implemented, too soon to assess goals progression  [] Other:     ASSESSMENT:  See eval    Treatment/Activity Tolerance:  [x] Patient tolerated treatment well [] Patient limited by fatique  [] Patient limited by pain  [] Patient limited by other medical complications  [] Other:     Prognosis: [x] Good [] Fair  [] Poor    Patient Requires Follow-up: [x] Yes  [] No    PLAN: See eval  [] Continue per plan of care [] Alter current plan (see comments)  [x] Plan of care initiated [] Hold pending MD visit [] Discharge    Electronically signed by: Alida Li, PT , DPT

## 2019-06-05 NOTE — PLAN OF CARE
[facility], [address] [facility]  [address] [place] [address]  Phone: [phone]  Fax [phone]     Physical Therapy Certification    Dear Referring Practitioner: Dr. Terri Garcia,    We had the pleasure of evaluating the following patient for physical therapy services at [address] [place] [address]. A summary of our findings can be found in the initial assessment below. This includes our plan of care. If you have any questions or concerns regarding these findings, please do not hesitate to contact me at the office phone number checked above. Thank you for the referral.       Physician Signature:_______________________________Date:__________________  By signing above (or electronic signature), therapists plan is approved by physician    Patient: Gonzalez Chamorro   : 2000   MRN: [id number]  Referring Physician: Referring Practitioner: Dr. Terri Garcia      Evaluation Date: 2019      Medical Diagnosis Information:  Diagnosis: M25.511 Right shoulder pain    Treatment Diagnosis: M25.511 Pain in right shoulder; M25.611 Stiffness in right shoulder; M62.81 Generalized weakness                                         Insurance information: PT Insurance Information: [facility]    Precautions/ Contra-indications:  R posterior labral tear; R G1 AC joint sprain; L SLAP tear (both treated conservatively)  Latex Allergy:  [x]NO      []YES  Preferred Language for Healthcare:   [x]English       []other:    SUBJECTIVE: Patient stated complaint: 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 Pt tore his labrum in his right shoulder (started having pain mid-April) and then sprained his AC joint in mid-March. He missed a few games because of the [facility] joint sprain and then played again until the end of May. He will be back in town for another 4 weeks and then goes back to [facility].  He feels pain in his AC joint, especially when moving reviewed. See intake form. Review Of Systems (ROS):  [x]Performed Review of systems (Integumentary, CardioPulmonary, Neurological) by intake and observation. Intake form has been scanned into medical record. Patient has been instructed to contact their primary care physician regarding ROS issues if not already being addressed at this time. Co-morbidities/Complexities (which will affect course of rehabilitation):   [x]None           Arthritic conditions   []Rheumatoid arthritis (M05.9)  []Osteoarthritis (M19.91)   Cardiovascular conditions   []Hypertension (I10)  []Hyperlipidemia (E78.5)  []Angina pectoris (I20)  []Atherosclerosis (I70)   Musculoskeletal conditions   []Disc pathology   []Congenital spine pathologies   []Prior surgical intervention  []Osteoporosis (M81.8)  []Osteopenia (M85.8)   Endocrine conditions   []Hypothyroid (E03.9)  []Hyperthyroid Gastrointestinal conditions   []Constipation (E11.12)   Metabolic conditions   []Morbid obesity (E66.01)  []Diabetes type 1(E10.65) or 2 (E11.65)   []Neuropathy (G60.9)     Pulmonary conditions   []Asthma (J45)  []Coughing   []COPD (J44.9)   Psychological Disorders  []Anxiety (F41.9)  []Depression (F32.9)   []Other:   [x]Other:   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   [] Falls Risk assessed and Patient requires intervention due to being higher risk   TUG score (>12s at risk):     [] Falls education provided, including       G-Codes:    Quick DASH 27% disability ASSESSMENT:   Functional Impairments   []Noted spinal or UE joint hypomobility   []Noted spinal or UE joint hypermobility   [x]Decreased UE functional ROM   [x]Decreased UE functional strength   []Abnormal reflexes/sensation/myotomal/dermatomal deficits   [x]Decreased RC/scapular/core strength and neuromuscular control   []other:      Functional Activity Limitations (from functional questionnaire and intake)   [x]Reduced ability to tolerate prolonged functional positions   []Reduced ability or difficulty with changes of positions or transfers between positions   []Reduced ability to maintain good posture and demonstrate good body mechanics with sitting, bending, and lifting   [] Reduced ability or tolerance with driving and/or computer work   []Reduced ability to sleep   [x]Reduced ability to perform lifting, reaching, carrying tasks   [x]Reduced ability to tolerate impact through UE   [x]Reduced ability to reach behind back   [x]Reduced ability to  or hold objects   [x]Reduced ability to throw or toss an object   []other:    Participation Restrictions   [x]Reduced participation in self care activities   [x]Reduced participation in home management activities   []Reduced participation in work activities   []Reduced participation in social activities. [x]Reduced participation in sport/recreation activities. Classification:   []Signs/symptoms consistent with post-surgical status including decreased ROM, strength and function.   [x]Signs/symptoms consistent with joint sprain/strain   []Signs/symptoms consistent with shoulder impingement   [x]Signs/symptoms consistent with shoulder/elbow/wrist tendinopathy   []Signs/symptoms consistent with Rotator cuff tear   [x]Signs/symptoms consistent with labral tear   []Signs/symptoms consistent with postural dysfunction    []Signs/symptoms consistent with Glenohumeral IR Deficit - <45 degrees   []Signs/symptoms consistent with facet dysfunction of cervical/thoracic spine    []Signs/symptoms consistent with pathology which may benefit from Dry needling     []other:     Prognosis/Rehab Potential:      []Excellent   [x]Good    []Fair   []Poor    Tolerance of evaluation/treatment:    []Excellent   [x]Good    []Fair   []Poor  Physical Therapy Evaluation Complexity Justification  [x] A history of present problem with:  [] no personal factors and/or comorbidities that impact the plan of care;  [x]1-2 personal factors and/or comorbidities that impact the plan of care  []3 personal factors and/or comorbidities that impact the plan of care  [x] An examination of body systems using standardized tests and measures addressing any of the following: body structures and functions (impairments), activity limitations, and/or participation restrictions;:  [] a total of 1-2 or more elements   [] a total of 3 or more elements   [x] a total of 4 or more elements   [x] A clinical presentation with:  [x] stable and/or uncomplicated characteristics   [] evolving clinical presentation with changing characteristics  [] unstable and unpredictable characteristics;   [x] Clinical decision making of [x] low, [] moderate, [] high complexity using standardized patient assessment instrument and/or measurable assessment of functional outcome. [x] EVAL (LOW) 33009 (typically 20 minutes face-to-face)  [] EVAL (MOD) 24496 (typically 30 minutes face-to-face)  [] EVAL (HIGH) 53720 (typically 45 minutes face-to-face)  [] RE-EVAL       PLAN:  Frequency/Duration:  2 days per week for 4 Weeks:  INTERVENTIONS:  [x] Therapeutic exercise including: strength training, ROM, for Upper extremity and core   [x]  NMR activation and proprioception for UE, scap and Core   [x] Manual therapy as indicated for shoulder, scapula and spine to include: Dry Needling/IASTM, STM, PROM, Gr I-IV mobilizations, manipulation.    [x] Modalities as needed that may include: thermal agents, E-stim, Biofeedback, US, iontophoresis as indicated  [x] Patient education on joint protection, postural re-education, activity modification, progression of HEP. HEP instruction:(see scanned forms)    GOALS:  Patient stated goal: Get back to baseball; strengthen R shoulder    Therapist goals for Patient:   Short Term Goals: To be achieved in: 2 weeks  1. Independent in HEP and progression per patient tolerance, in order to prevent re-injury. 2. Patient will have a decrease in pain to facilitate improvement in movement, function, and ADLs as indicated by Functional Deficits. Long Term Goals: To be achieved in: 4 weeks  1. Disability index score of 11% or less for the [facility] to assist with reaching prior level of function. 2. Patient will demonstrate increased R shoulder functional IR AROM to T4 without pain. 3. Patient will demonstrate an increase in Strength to 4+/5 for the low trap B, 5/5 for the middle trap B, and 5/5 for ER at 90-90 B in order to improve scapular kinesis with UE reaching and throwing motions. 4. Patient will have a negative ulnar ULNTT. 5. 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         Electronically signed by:  Halle Cardoza, PT, DPT

## 2019-06-12 ENCOUNTER — HOSPITAL ENCOUNTER (OUTPATIENT)
Dept: PHYSICAL THERAPY | Age: 19
Setting detail: THERAPIES SERIES
Discharge: HOME | End: 2019-06-12
Payer: COMMERCIAL

## 2019-06-12 PROCEDURE — 97035 APP MDLTY 1+ULTRASOUND EA 15: CPT

## 2019-06-12 PROCEDURE — 97110 THERAPEUTIC EXERCISES: CPT

## 2019-06-12 PROCEDURE — 97140 MANUAL THERAPY 1/> REGIONS: CPT

## 2019-06-12 NOTE — FLOWSHEET NOTE
[facility], [address] [place]  Phone: [phone]  Fax [phone]      Physical Therapy Daily Treatment Note  Date:  2019    Patient Name:  Ash To    :  2000  MRN: [id number]  Restrictions/Precautions:    Medical/Treatment Diagnosis Information:  · Diagnosis: M25.511 Right shoulder pain   · Treatment Diagnosis: M25.511 Pain in right shoulder; M25.611 Stiffness in right shoulder; M62.81 Generalized weakness  Insurance/Certification information:  PT Insurance Information: [employer]  Physician Information:  Referring Practitioner: Dr. Anna Way of care signed (Y/N):     Date of Patient follow up with Physician:      G-Code (if applicable):      Date G-Code Applied:     Quick DASH 27% disability    Progress Note: [x]  Yes  []  No  Next due by: Visit #10      Latex Allergy:  [x]NO      []YES  Preferred Language for Healthcare:   [x]English       []other:    Visit # Insurance Allowable Requires auth   2 Check when scanned    []no        []yes:     Pain level: 0-8/10 in right elbow     SUBJECTIVE:  Pt reports that since last Friday, he has developed pain in the back of his elbow. When he goes to reach for something quickly, he gets 8/10 pain. 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 Reports that his HEP is going well, the nerve glide seems to help the tingling in his pinky. OBJECTIVE:   Observation:TTP tricep insertion with pain upon resisted tricep action . Full elbow PROM/AROM, but pain at end range. Test measurements:  - elbow valgus testing for laxity, but mild pain at UCL.      RESTRICTIONS/PRECAUTIONS: R posterior labral tear; R G1 AC joint sprain; L SLAP tear (both treated conservatively)        Exercises/Interventions:   Therapeutic Ex Sets/rep comments HEP   Pt ed:findings of exam and POC, communication with AT at [facility] and possible use of topical medication, activity modification 5'     pec stretch on FR  X   Thoracic ext mob on FR  X   SA reach supine 5# X   Prone SWB Y  Prone SWB T 0#  3# X   iso ER walkouts 90-90 Green  X         Ulnar nerve glide  20x Supine, elbow flexion and wrist f/e only  *performed LV, but did not document in error, was performing elbow f/e for floss* X   Wrist flexor stretch  Wrist extensor stretch 30\"x3 ea  X  X   Post capsule stretch 30\"x3                                                     aridyne WESLEY 4' UE's only    Manual Intervention      SASTM (light) tricep and wrist flexors, XFM tricep insertion 15'     Ulnohumeral distraction GIII  Medial ulno-humeral glide GIII 15\"x5  15\"x3                 Thoracic mobs      Ulnar nerve glide                              NMR re-education                                                          Therapeutic Exercise and NMR EXR  [x] (24963) Provided verbal/tactile cueing for activities related to strengthening, flexibility, endurance, ROM  for improvements in scapular, scapulothoracic and UE control with self care, reaching, carrying, lifting, house/yardwork, driving/computer work.    [] (99220) Provided verbal/tactile cueing for activities related to improving balance, coordination, kinesthetic sense, posture, motor skill, proprioception  to assist with  scapular, scapulothoracic and UE control with self care, reaching, carrying, lifting, house/yardwork, driving/computer work. Therapeutic Activities:    [] (55080 or 36014) Provided verbal/tactile cueing for activities related to improving balance, coordination, kinesthetic sense, posture, motor skill, proprioception and motor activation to allow for proper function of scapular, scapulothoracic and UE control with self care, carrying, lifting, driving/computer work.      Home Exercise Program:    [x] (99945) Reviewed/Progressed HEP activities related to strengthening, flexibility, endurance, ROM of scapular, scapulothoracic and UE control with self care, reaching, carrying, lifting, house/yardwork, driving/computer work  [] (41412) Reviewed/Progressed HEP activities related to improving balance, coordination, kinesthetic sense, posture, motor skill, proprioception of scapular, scapulothoracic and UE control with self care, reaching, carrying, lifting, house/yardwork, driving/computer work      Manual Treatments:  PROM / STM / Oscillations-Mobs:  G-I, II, III, IV (PA's, Inf., Post.)  [x] (28835) Provided manual therapy to mobilize soft tissue/joints of cervical/CT, scapular GHJ and UE for the purpose of modulating pain, promoting relaxation,  increasing ROM, reducing/eliminating soft tissue swelling/inflammation/restriction, improving soft tissue extensibility and allowing for proper ROM for normal function with self care, reaching, carrying, lifting, house/yardwork, driving/computer work    Modalities: pulsed e-stim 20% 1.0 stewart/cm2 3 MHZ x 8'    Charges:  Timed Code Treatment Minutes: 40   Total Treatment Minutes: 40     [] EVAL (LOW) 71117 (typically 20 minutes face-to-face)  [] EVAL (MOD) 83089 (typically 30 minutes face-to-face)  [] EVAL (HIGH) 59629 (typically 45 minutes face-to-face)  [] RE-EVAL     [x] DH(72532) x  1   [] IONTO  [] NMR (16547) x      [] VASO  [x] Manual (19767) x  1    [x] Other:ultrasound  [] TA x       [] Mech Traction (77287)  [] ES(attended) (49934)      [] ES (un) (93304):     Lala Simmons stated goal: Get back to baseball; strengthen R shoulder     Therapist goals for Patient:   Short Term Goals: To be achieved in: 2 weeks  1. Independent in HEP and progression per patient tolerance, in order to prevent re-injury. 2. Patient will have a decrease in pain to facilitate improvement in movement, function, and ADLs as indicated by Functional Deficits.     Long Term Goals: To be achieved in: 4 weeks  1. Disability index score of 11% or less for the [facility] to assist with reaching prior level of function. 2. Patient will demonstrate increased R shoulder functional IR AROM to T4 without pain. 3. Patient will demonstrate an increase in Strength to 4+/5 for the low trap B, 5/5 for the middle trap B, and 5/5 for ER at 90-90 B in order to improve scapular kinesis with UE reaching and throwing motions. 4. Patient will have a negative ulnar ULNTT. 5. 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 Progression Towards Functional goals:  [] Patient is progressing as expected towards functional goals listed. [] Progression is slowed due to complexities listed. [] Progression has been slowed due to co-morbidities. [x] Plan just implemented, too soon to assess goals progression  [] Other:     ASSESSMENT:  Pt appears to have a tricep strain, exacerbated by job duties (frequent scrubbing/cleaning). This may be a symptom from taking a ball to the elbow in a game in May. Modified his ulnar nerve glide to floss at the wrist with a fixed elbow flexion position so that his ulnar nerve does not snap. Pulsed US today for increased blood flow to area, but will communicate with AT at [employer] regarding current sx and possibility of contacting MD for dexamethasone. No shoulder strengthening today d/t quite intense elbow pain.     Treatment/Activity Tolerance:  [x] Patient tolerated treatment well [] Patient limited by fatique  [] Patient limited by pain  [] Patient limited by other medical complications  [] Other:     Prognosis: [x] Good [] Fair  [] Poor    Patient Requires Follow-up: [x] Yes  [] No    PLAN: Contact referrer, [place] AT for iontophoresis  [x] Continue per plan of care [] Alter current plan (see comments)  [] Plan of care initiated [] Hold pending MD visit [] Discharge    Electronically signed by: Ander Cortés, PT , DPT

## 2019-06-14 ENCOUNTER — HOSPITAL ENCOUNTER (OUTPATIENT)
Dept: PHYSICAL THERAPY | Age: 19
Setting detail: THERAPIES SERIES
Discharge: HOME | End: 2019-06-14
Payer: COMMERCIAL

## 2019-06-14 PROCEDURE — 97140 MANUAL THERAPY 1/> REGIONS: CPT

## 2019-06-14 PROCEDURE — 97110 THERAPEUTIC EXERCISES: CPT

## 2019-06-14 NOTE — FLOWSHEET NOTE
Sets/rep comments HEP   Pt ed:Not concerned for avulsion injury as there was no recent trauma that caused recent lack elbow ext but will monitor. Likely inflammation and lack of joint mobility. With exc for shoulder, can avoid wrist flex/gripping to reduce FCU soreness.  Ice to elbow but avoid pressure on ulnar groove 8'     pec stretch on FR  Lat S on FR  X   Thoracic ext mob on FR  X   SA reach supine 2x205# X   Prone SWB Y  Prone SWB T 3 ea30\" Rhythmic stab through upper arms above elbows so not having to hold weights X   iso ER walkouts 90-90 10xGreen  X         Ulnar nerve glide  20x Supine, elbow flexion and wrist f/e only  *performed LV, but did not document in error, was performing elbow f/e for floss* X   Wrist flexor stretch  Wrist extensor stretch 30\"x3 ea  X  X   Post capsule stretch 30\"x3     Standing horiz abd 2x10 Yellow kept forearm in neutral/wrist flexion to reduce     D2 flex (kept wrist in flexion) 2x10 Did not fully extend elbow to keep out of painful end range; yellow    Rows 25#  LPDs 45# 2x15 ea Tall kneeling on BOSU, stop at plane of body (used double  bar to keep forearms in neutral to reduce sup RUJ pulling)                                  aridyne WESLEY  UE's only    Manual Intervention      SASTM (light) tricep and wrist flexors, XFM tricep insertion 15'     Ulnohumeral distraction GIII  Medial ulno-humeral glide GIII  Superior RU volar glide to help inc supination 15\"x5  15\"x5  15\"x5         Gave youtube video on email                Thoracic mobs      Ulnar nerve glide                              NMR re-education                                                          Therapeutic Exercise and NMR EXR  [x] (21368) Provided verbal/tactile cueing for activities related to strengthening, flexibility, endurance, ROM  for improvements in scapular, scapulothoracic and UE control with self care, reaching, carrying, lifting, house/yardwork, driving/computer work.    [] (62844) Provided verbal/tactile cueing for activities related to improving balance, coordination, kinesthetic sense, posture, motor skill, proprioception  to assist with  scapular, scapulothoracic and UE control with self care, reaching, carrying, lifting, house/yardwork, driving/computer work. Therapeutic Activities:    [] (46215 or 42992) Provided verbal/tactile cueing for activities related to improving balance, coordination, kinesthetic sense, posture, motor skill, proprioception and motor activation to allow for proper function of scapular, scapulothoracic and UE control with self care, carrying, lifting, driving/computer work.      Home Exercise Program:    [x] (46432) Reviewed/Progressed HEP activities related to strengthening, flexibility, endurance, ROM of scapular, scapulothoracic and UE control with self care, reaching, carrying, lifting, house/yardwork, driving/computer work  [] (24621) Reviewed/Progressed HEP activities related to improving balance, coordination, kinesthetic sense, posture, motor skill, proprioception of scapular, scapulothoracic and UE control with self care, reaching, carrying, lifting, house/yardwork, driving/computer work      Manual Treatments:  PROM / STM / Oscillations-Mobs:  G-I, II, III, IV (PA's, Inf., Post.)  [x] (47252) Provided manual therapy to mobilize soft tissue/joints of cervical/CT, scapular GHJ and UE for the purpose of modulating pain, promoting relaxation,  increasing ROM, reducing/eliminating soft tissue swelling/inflammation/restriction, improving soft tissue extensibility and allowing for proper ROM for normal function with self care, reaching, carrying, lifting, house/yardwork, driving/computer work    Modalities:  Pt states he hasn't felt helped, wants to hold today and just ice/stim at home    Charges:  Timed Code Treatment Minutes: 50   Total Treatment Minutes: 50     [] EVAL (LOW) 74072 (typically 20 minutes face-to-face)  [] EVAL (MOD) 54265 (typically 30 minutes face-to-face)  [] EVAL (HIGH) 05527 (typically 45 minutes face-to-face)  [] RE-EVAL     [x] AQ(65501) x  2   [] IONTO  [] NMR (71886) x      [] VASO  [x] Manual (68078) x  1    [] Other:ultrasound  [] TA x       [] Mech Traction (42038)  [] ES(attended) (43088)      [] ES (un) (56019):     Kyle Lala stated goal: Get back to baseball; strengthen R shoulder     Therapist goals for Patient:   Short Term Goals: To be achieved in: 2 weeks  1. Independent in HEP and progression per patient tolerance, in order to prevent re-injury. 2. Patient will have a decrease in pain to facilitate improvement in movement, function, and ADLs as indicated by Functional Deficits.     Long Term Goals: To be achieved in: 4 weeks  1. Disability index score of 11% or less for the [facility] to assist with reaching prior level of function. 2. Patient will demonstrate increased R shoulder functional IR AROM to T4 without pain. 3. Patient will demonstrate an increase in Strength to 4+/5 for the low trap B, 5/5 for the middle trap B, and 5/5 for ER at 90-90 B in order to improve scapular kinesis with UE reaching and throwing motions. 4. Patient will have a negative ulnar ULNTT. 5. 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 Progression Towards Functional goals:  [] Patient is progressing as expected towards functional goals listed. [] Progression is slowed due to complexities listed. [] Progression has been slowed due to co-morbidities. [x] Plan just implemented, too soon to assess goals progression  [] Other:     ASSESSMENT:  Pt had lack of elbow ext/forearm sup this visit that he hadn't had in past session as well as inc'd edema along post sup RUJ. He gained mild improvement in ext after mobilization, but tolerated well.  Was able to do scap stab work, but avoided weight held/gripping or when did have to  kept forearm in neutral and wrist in slight flexion to reduce stretch at FCU and at [address] Treatment/Activity Tolerance:  [x] Patient tolerated treatment well [] Patient limited by fatique  [] Patient limited by pain  [] Patient limited by other medical complications  [] Other:     Prognosis: [x] Good [] Fair  [] Poor    Patient Requires Follow-up: [x] Yes  [] No    PLAN: Iontophoresis [place], pt to continue NSAID for home.   [x] Continue per plan of care [] Alter current plan (see comments)  [] Plan of care initiated [] Hold pending MD visit [] Discharge    Electronically signed by: Linda Willett, PT , DPT

## 2019-06-17 RX ORDER — DICLOFENAC SODIUM 75 MG/1
75 TABLET, DELAYED RELEASE ORAL 2 TIMES DAILY
Qty: 60 TABLET | Refills: 2 | Status: SHIPPED | OUTPATIENT
Start: 2019-06-17

## 2019-06-18 ENCOUNTER — APPOINTMENT (OUTPATIENT)
Dept: PHYSICAL THERAPY | Age: 19
End: 2019-06-18
Payer: COMMERCIAL

## 2019-06-21 ENCOUNTER — HOSPITAL ENCOUNTER (OUTPATIENT)
Dept: PHYSICAL THERAPY | Age: 19
Setting detail: THERAPIES SERIES
Discharge: HOME | End: 2019-06-21
Payer: COMMERCIAL

## 2019-06-21 PROCEDURE — 97140 MANUAL THERAPY 1/> REGIONS: CPT

## 2019-06-21 PROCEDURE — 97110 THERAPEUTIC EXERCISES: CPT

## 2019-06-21 NOTE — FLOWSHEET NOTE
[facility],  [address] [place]  Phone: [phone]  Fax [phone]      Physical Therapy Daily Treatment Note  Date:  2019    Patient Name:  Sorin Gómez    :  2000  MRN: [id number]  Restrictions/Precautions:    Medical/Treatment Diagnosis Information:  · Diagnosis: M25.511 Right shoulder pain   · Treatment Diagnosis: M25.511 Pain in right shoulder; M25.611 Stiffness in right shoulder; M62.81 Generalized weakness  Insurance/Certification information:  PT Insurance Information: [employer]  Physician Information:  Referring Practitioner: Dr. Beka Landaverde of care signed (Y/N):     Date of Patient follow up with Physician:      G-Code (if applicable):      Date G-Code Applied:     Quick DASH 27% disability    Progress Note: [x]  Yes  []  No  Next due by: Visit #10      Latex Allergy:  [x]NO      []YES  Preferred Language for Healthcare:   [x]English       []other:    Visit # Insurance Allowable Requires auth   4 Check when scanned    []no        []yes:     Pain level: 0-3/10 in right elbow     SUBJECTIVE:  Pt reports that he did ionto with his AT at [place] on Monday and this actually helped a lot. He did see Dr. Jun Zapata on Wednesday, who ordered an MRI on his elbow. He is having this tonight. He is now able to extend his elbow fully. Dr. Jun Zapata recommended that he does not play baseball this summer. His right shoulder still feels weak and there is pain up to 3/10 at most in the past week. OBJECTIVE:   Renan Cannon at end range PROM flexion at ACJ; full elbow extension today  Test measurements:  - elbow valgus testing for laxity, but mild pain at UCL. RESTRICTIONS/PRECAUTIONS: R posterior labral tear; R G1 AC joint sprain; L SLAP tear (both treated conservatively)        Exercises/Interventions:   Therapeutic Ex Sets/rep comments HEP   Pt ed: Will focus on shoulder again as this was original POC and elbow ROM is normal now. Will await MRI results 8'     pec stretch on FR  Lat S on FR  X   Thoracic ext mob on FR  X   SA reach supine 2x205# X   Prone SWB Y  Prone SWB T 2x15 ea1#  3# X   iso ER walkouts 90-90 R,L 10xyellow  X         Ulnar nerve glide   Supine, elbow flexion and wrist f/e only  *performed LV, but did not document in error, was performing elbow f/e for floss* X   Wrist flexor stretch  Wrist extensor stretch  X  X   Post capsule stretch     UT stretch R,L 3x15\" ea     Standing horiz abd 2x15 Yellow kept forearm in neutral/wrist flexion to reduce     D1 flex, D2 flex (kept wrist in flexion) 2x15 Able to extend today, yellow    Rows 25#  LPDs 45# 2x15 ea Tall kneeling on BOSU, stop at plane of body (used double  bar to keep forearms in neutral to reduce sup RUJ pulling)    Prone ER  3x10 90-90                            aridyne WESLEY  UE's only    Manual Intervention      SASTM post cuff and deltoid, UT, lats 5'     Ulnohumeral distraction GIII  Medial ulno-humeral glide GIII  Superior RU volar glide to help inc supination        Gave youtube video on email    CTJ manip (RUE taken out) 2x No cavitations, but flexion ROM better.      GHJ inf and post mobs GIII 15\"x4 ea     PROM sh flexion (+ ACJ inf glide)  Abduction  IR 10x ea           Thoracic mobs      Ulnar nerve glide                        Assess 1st and 2nd ribs 2' Normal position    NMR re-education                                                          Therapeutic Exercise and NMR EXR  [x] (97850) Provided verbal/tactile cueing for activities related to strengthening, flexibility, endurance, ROM  for improvements in scapular, scapulothoracic and UE control with self care, reaching, carrying, lifting, house/yardwork, driving/computer work.    [] (28879) Provided verbal/tactile cueing for activities related to improving balance, coordination, kinesthetic sense, posture, motor skill, proprioception  to assist with scapular, scapulothoracic and UE control with self care, reaching, carrying, lifting, house/yardwork, driving/computer work. Therapeutic Activities:    [] (63427 or 68329) Provided verbal/tactile cueing for activities related to improving balance, coordination, kinesthetic sense, posture, motor skill, proprioception and motor activation to allow for proper function of scapular, scapulothoracic and UE control with self care, carrying, lifting, driving/computer work.      Home Exercise Program:    [x] (68299) Reviewed/Progressed HEP activities related to strengthening, flexibility, endurance, ROM of scapular, scapulothoracic and UE control with self care, reaching, carrying, lifting, house/yardwork, driving/computer work  [] (10735) Reviewed/Progressed HEP activities related to improving balance, coordination, kinesthetic sense, posture, motor skill, proprioception of scapular, scapulothoracic and UE control with self care, reaching, carrying, lifting, house/yardwork, driving/computer work      Manual Treatments:  PROM / STM / Oscillations-Mobs:  G-I, II, III, IV (PA's, Inf., Post.)  [x] (07670) Provided manual therapy to mobilize soft tissue/joints of cervical/CT, scapular GHJ and UE for the purpose of modulating pain, promoting relaxation,  increasing ROM, reducing/eliminating soft tissue swelling/inflammation/restriction, improving soft tissue extensibility and allowing for proper ROM for normal function with self care, reaching, carrying, lifting, house/yardwork, driving/computer work    Modalities: declines modalities    Charges:  Timed Code Treatment Minutes: 50   Total Treatment Minutes: 60 (rest breaks)     [] EVAL (LOW) 72888 (typically 20 minutes face-to-face)  [] EVAL (MOD) 91741 (typically 30 minutes face-to-face)  [] EVAL (HIGH) 64617 (typically 45 minutes face-to-face)  [] RE-EVAL     [x] GT(17783) x  2   [] IONTO  [] NMR (64493) x      [] VASO  [x] Manual (95734) x  1    [] Other:ultrasound  [] TA x [] [facility] Traction (57476)  [] ES(attended) (20726)      [] ES (un) (58368):     Sharron Del Cid stated goal: Get back to baseball; strengthen R shoulder     Therapist goals for Patient:   Short Term Goals: To be achieved in: 2 weeks  1. Independent in HEP and progression per patient tolerance, in order to prevent re-injury. 2. Patient will have a decrease in pain to facilitate improvement in movement, function, and ADLs as indicated by Functional Deficits.     Long Term Goals: To be achieved in: 4 weeks  1. Disability index score of 11% or less for the [facility] to assist with reaching prior level of function. 2. Patient will demonstrate increased R shoulder functional IR AROM to T4 without pain. 3. Patient will demonstrate an increase in Strength to 4+/5 for the low trap B, 5/5 for the middle trap B, and 5/5 for ER at 90-90 B in order to improve scapular kinesis with UE reaching and throwing motions. 4. Patient will have a negative ulnar ULNTT. 5. 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 Progression Towards Functional goals:  [] Patient is progressing as expected towards functional goals listed. [] Progression is slowed due to complexities listed. [] Progression has been slowed due to co-morbidities. [x] Plan just implemented, too soon to assess goals progression  [] Other:     ASSESSMENT:  Pt's elbow ROM improved this visit and he is getting an MRI for this condition, so re-focused on R shoulder complaints. He was able to complete RUE strengthening without c/o elbow pain this visit, asked him to keep mild elbow flexion (instead of full ext) if he did have pain. He does have end range pain at ACJ with flexion PROM, but not AROM. Has good awareness of scapular position, but fatigues easily and needs to rest intermittently.      Treatment/Activity Tolerance:  [x] Patient tolerated treatment well [] Patient limited by han  [] Patient limited by pain  [] Patient limited by other medical complications  [] Other:     Prognosis: [x] Good [] Fair  [] Poor    Patient Requires Follow-up: [x] Yes  [] No    PLAN: Iontophoresis [place], pt to continue NSAID for home.   [x] Continue per plan of care [] Alter current plan (see comments)  [] Plan of care initiated [] Hold pending MD visit [] Discharge    Electronically signed by: Jodi Taylro, PT , DPT

## 2019-06-24 ENCOUNTER — HOSPITAL ENCOUNTER (OUTPATIENT)
Dept: PHYSICAL THERAPY | Age: 19
Setting detail: THERAPIES SERIES
Discharge: HOME | End: 2019-06-24
Payer: COMMERCIAL

## 2019-06-24 PROCEDURE — 97110 THERAPEUTIC EXERCISES: CPT

## 2019-06-24 PROCEDURE — 97140 MANUAL THERAPY 1/> REGIONS: CPT

## 2019-06-24 NOTE — FLOWSHEET NOTE
[facility],  [address] [place]  Phone: [phone]  Fax [phone]      Physical Therapy Daily Treatment Note  Date:  2019    Patient Name:  Kwame Schafer    :  2000  MRN: [id number]  Restrictions/Precautions:    Medical/Treatment Diagnosis Information:  · Diagnosis: M25.511 Right shoulder pain   · Treatment Diagnosis: M25.511 Pain in right shoulder; M25.611 Stiffness in right shoulder; M62.81 Generalized weakness  Insurance/Certification information:  PT Insurance Information: [employer]  Physician Information:  Referring Practitioner: Dr. Markus Licona of care signed (Y/N):     Date of Patient follow up with Physician:      G-Code (if applicable):      Date G-Code Applied:     Quick DASH 27% disability    Progress Note: [x]  Yes  []  No  Next due by: Visit #10      Latex Allergy:  [x]NO      []YES  Preferred Language for Healthcare:   [x]English       []other:    Visit # Insurance Allowable Requires auth   5 Check when scanned    []no        []yes:     Pain level: 0-3/10 in right elbow     SUBJECTIVE:  Pt reports that his AT had the results of his MRI- he has a tricep strain and swelling around his ulnar nerve. His shoulder was sore after LV, but not painful (muscles were sore). OBJECTIVE:   Clyde Hanh at end range PROM flexion at ACJ; full elbow extension today  Test measurements:  - elbow valgus testing for laxity, but mild pain at UCL. RESTRICTIONS/PRECAUTIONS: R posterior labral tear; R G1 AC joint sprain; L SLAP tear (both treated conservatively)        Exercises/Interventions:   Therapeutic Ex Sets/rep comments HEP   Pt ed: Will focus on shoulder again as this was original POC and elbow ROM is normal now.  Will await MRI results 8'     pec stretch on FR  Lat S on FR 3x1'  15\"x3 ea X   Thoracic ext mob on FR  X   SA reach supine 2x205# X   Prone SWB Y  Prone SWB T  \" W 2x15 ea2#  3#  2# X   iso ER walkouts 90-90 R,L 10xred X         Ulnar nerve glide   Supine, elbow flexion and wrist f/e only  *performed LV, but did not document in error, was performing elbow f/e for floss* X   Wrist flexor stretch  Wrist extensor stretch  X  X   Post capsule stretch     UT stretch R,L 3x15\" ea     Standing horiz abd 2x15 blue     Low trap tug 2x20 1 blue, 1 black    D1 flex, D2 flex (kept wrist in flexion) 2x15 Able to extend today, red    Rows 55, 60#  LPDs 70#, 80# 2x15 ea Tall kneeling on BOSU, stop at plane of body (used double  bar to keep forearms in neutral to reduce sup RUJ pulling)    Prone ER  3x10 1# 90-90                            aridyne WESLEY  UE's only    Manual Intervention      SASTM post cuff and deltoid, UT, lats 5'     Ulnohumeral distraction GIII  Medial ulno-humeral glide GIII  Superior RU volar glide to help inc supination        Gave youtube video on email    CTJ manip (RUE taken out)  No cavitations, but flexion ROM better. GHJ inf and post mobs GIII 15\"x4 ea     PROM sh flexion (+ ACJ inf glide)  Abduction  IR 10x ea           Thoracic mobs      Ulnar nerve glide                        Assess 1st and 2nd ribs  Normal position    NMR re-education                                                          Therapeutic Exercise and NMR EXR  [x] (31336) Provided verbal/tactile cueing for activities related to strengthening, flexibility, endurance, ROM  for improvements in scapular, scapulothoracic and UE control with self care, reaching, carrying, lifting, house/yardwork, driving/computer work.    [] (74313) Provided verbal/tactile cueing for activities related to improving balance, coordination, kinesthetic sense, posture, motor skill, proprioception  to assist with  scapular, scapulothoracic and UE control with self care, reaching, carrying, lifting, house/yardwork, driving/computer work.     Therapeutic Activities:    [] (29002 or 88918) Provided verbal/tactile cueing Goals: To be achieved in: 2 weeks  1. Independent in HEP and progression per patient tolerance, in order to prevent re-injury. 2. Patient will have a decrease in pain to facilitate improvement in movement, function, and ADLs as indicated by Functional Deficits.     Long Term Goals: To be achieved in: 4 weeks  1. Disability index score of 11% or less for the [facility] to assist with reaching prior level of function. 2. Patient will demonstrate increased R shoulder functional IR AROM to T4 without pain. 3. Patient will demonstrate an increase in Strength to 4+/5 for the low trap B, 5/5 for the middle trap B, and 5/5 for ER at 90-90 B in order to improve scapular kinesis with UE reaching and throwing motions. 4. Patient will have a negative ulnar ULNTT. 5. 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 Progression Towards Functional goals:  [] Patient is progressing as expected towards functional goals listed. [] Progression is slowed due to complexities listed. [] Progression has been slowed due to co-morbidities. [x] Plan just implemented, too soon to assess goals progression  [] Other:     ASSESSMENT:  Pt was fatigued after session, but not painful in his shoulder. He continues to fatigue most easily with ER strengthening at 90-90 deg.      Treatment/Activity Tolerance:  [x] Patient tolerated treatment well [] Patient limited by fatique  [] Patient limited by pain  [] Patient limited by other medical complications  [] Other:     Prognosis: [x] Good [] Fair  [] Poor    Patient Requires Follow-up: [x] Yes  [] No    PLAN:contact MD regarding iontophoresis for elbow  [x] Continue per plan of care [] Alter current plan (see comments)  [] Plan of care initiated [] Hold pending MD visit [] Discharge    Electronically signed by: Alida Li PT , DPT

## 2019-06-27 ENCOUNTER — HOSPITAL ENCOUNTER (OUTPATIENT)
Dept: PHYSICAL THERAPY | Age: 19
Setting detail: THERAPIES SERIES
Discharge: HOME | End: 2019-06-27
Payer: COMMERCIAL

## 2019-06-27 PROCEDURE — 97110 THERAPEUTIC EXERCISES: CPT

## 2019-06-27 PROCEDURE — 97140 MANUAL THERAPY 1/> REGIONS: CPT

## 2019-06-27 NOTE — FLOWSHEET NOTE
ea3#  3#  3# X   iso ER walkouts 90-90 R,L 10xred X         Ulnar nerve glide   Supine, elbow flexion and wrist f/e only  *performed LV, but did not document in error, was performing elbow f/e for floss* X   Wrist flexor stretch  Wrist extensor stretch  X  X   Post capsule stretch     UT stretch R,L 3x15\" ea indep    Standing horiz abd 2x15 black     Low trap tug 2x20 2 grey    D1 flex, D2 flex (kept wrist in flexion) 2x15 Tall kneeling on BOSU    tricep eccentric 2x15 green    Rows 55, 60#  LPDs 70#, 80# 2x15 ea With AT     Prone ER  3x10 2# 90-90                            aridyne WESLEY 5' UE's only    Manual Intervention      SASTM post cuff and deltoid, UT, lats 5'     Ulnohumeral distraction GIII  Medial ulno-humeral glide GIII  Superior RU volar glide to help inc supination        Gave youtube video on email    CTJ manip (RUE taken out)  No cavitations, but flexion ROM better. GHJ inf and post mobs GIII 15\"x4 ea     PROM sh flexion (+ ACJ inf glide)  Abduction  IR 10x ea           Thoracic mobs      Ulnar nerve glide                        Assess 1st and 2nd ribs  Normal position    NMR re-education                                                          Therapeutic Exercise and NMR EXR  [x] (08104) Provided verbal/tactile cueing for activities related to strengthening, flexibility, endurance, ROM  for improvements in scapular, scapulothoracic and UE control with self care, reaching, carrying, lifting, house/yardwork, driving/computer work.    [] (48901) Provided verbal/tactile cueing for activities related to improving balance, coordination, kinesthetic sense, posture, motor skill, proprioception  to assist with  scapular, scapulothoracic and UE control with self care, reaching, carrying, lifting, house/yardwork, driving/computer work.     Therapeutic Activities:    [] (32288 or 64825) Provided verbal/tactile cueing for activities related to improving balance, coordination, kinesthetic sense, posture, motor patient tolerance, in order to prevent re-injury. 2. Patient will have a decrease in pain to facilitate improvement in movement, function, and ADLs as indicated by Functional Deficits.     Long Term Goals: To be achieved in: 4 weeks  1. Disability index score of 11% or less for the [facility] to assist with reaching prior level of function. 2. Patient will demonstrate increased R shoulder functional IR AROM to T4 without pain. 3. Patient will demonstrate an increase in Strength to 4+/5 for the low trap B, 5/5 for the middle trap B, and 5/5 for ER at 90-90 B in order to improve scapular kinesis with UE reaching and throwing motions. 4. Patient will have a negative ulnar ULNTT. 5. 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 Progression Towards Functional goals:  [] Patient is progressing as expected towards functional goals listed. [] Progression is slowed due to complexities listed. [] Progression has been slowed due to co-morbidities. [x] Plan just implemented, too soon to assess goals progression  [] Other:     ASSESSMENT:  Pt was fatigued after session, but not painful in his shoulder. He continues to fatigue most easily with ER strengthening at 90-90 deg. Negative ulnar nerve testing currently. Pt worked with AT for plyometric/proprioceptive work.      Treatment/Activity Tolerance:  [x] Patient tolerated treatment well [] Patient limited by fatique  [] Patient limited by pain  [] Patient limited by other medical complications  [] Other:     Prognosis: [x] Good [] Fair  [] Poor    Patient Requires Follow-up: [x] Yes  [] No    PLAN:continue RC strengthening, scapular strengthening  [x] Continue per plan of care [] Alter current plan (see comments)  [] Plan of care initiated [] Hold pending MD visit [] Discharge    Electronically signed by: Nash Noyola, PT , DPT

## 2019-07-02 ENCOUNTER — HOSPITAL ENCOUNTER (OUTPATIENT)
Dept: PHYSICAL THERAPY | Age: 19
Setting detail: THERAPIES SERIES
Discharge: HOME | End: 2019-07-02
Payer: COMMERCIAL

## 2019-07-02 PROCEDURE — 97110 THERAPEUTIC EXERCISES: CPT

## 2019-07-02 PROCEDURE — 97140 MANUAL THERAPY 1/> REGIONS: CPT

## 2019-07-02 NOTE — PROGRESS NOTES
lifting/reaching/carrying - Reduced overall functional level  with carrying and lifting   [x]unable to perform sport/recreational activity due to pain and dysfunction   []other:       Prognosis/Rehab Potential:    []Excellent   [x]Good    []Fair   []Poor: Toleration of evaluation or treatment:    []Excellent   [x]Good    []Fair   []Poor     New or Updated Goals (if applicable):  [x] No change to goals established upon initial eval/last progress note:  New Goals:    GOALS: Therapist goals for Patient:   Short Term Goals: To be achieved in: 2 weeks  1. Independent in HEP and progression per patient tolerance, in order to prevent re-injury. met  2. Patient will have a decrease in pain to facilitate improvement in movement, function, and ADLs as indicated by Functional Deficits. met     Long Term Goals: To be achieved in: 4 weeks  1. Disability index score of 11% or less for the [facility] assist with reaching prior level of function. Not tested  2. Patient will demonstrate increased R shoulder functional IR AROM to T4 without pain.  met  3. Patient will demonstrate an increase in Strength to 4+/5 for the low trap B, 5/5 for the middle trap B, and 5/5 for ER at 90-90 B in order to improve scapular kinesis with UE reaching and throwing motions. progress  4. Patient will have a negative ulnar ULNTT. met  5. 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 pt to start back into throwing    Rehab Potential:   []Excellent   [x] Good   [] Fair   [] Poor    Plan of Care:  [x] Continue Current Therapy Intervention    Frequency/Duration:  1 days per week for 2 Weeks:  HEP instruction:   1. Therapeutic exercise including: strength training, ROM, NMR and proprioception for the scapula, core and Upper extremity  2. Manual therapy as indicated including Dry Needling/IASTM, STM, PROM, Gr I-IV mobilizations, spinal mobilization/manipulation.    3. Modalities as needed including: thermal

## 2019-07-02 NOTE — FLOWSHEET NOTE
in scapular, scapulothoracic and UE control with self care, reaching, carrying, lifting, house/yardwork, driving/computer work.    [] (40613) Provided verbal/tactile cueing for activities related to improving balance, coordination, kinesthetic sense, posture, motor skill, proprioception  to assist with  scapular, scapulothoracic and UE control with self care, reaching, carrying, lifting, house/yardwork, driving/computer work. Therapeutic Activities:    [] (35222 or 41154) Provided verbal/tactile cueing for activities related to improving balance, coordination, kinesthetic sense, posture, motor skill, proprioception and motor activation to allow for proper function of scapular, scapulothoracic and UE control with self care, carrying, lifting, driving/computer work.      Home Exercise Program:    [x] (58658) Reviewed/Progressed HEP activities related to strengthening, flexibility, endurance, ROM of scapular, scapulothoracic and UE control with self care, reaching, carrying, lifting, house/yardwork, driving/computer work  [] (85957) Reviewed/Progressed HEP activities related to improving balance, coordination, kinesthetic sense, posture, motor skill, proprioception of scapular, scapulothoracic and UE control with self care, reaching, carrying, lifting, house/yardwork, driving/computer work      Manual Treatments:  PROM / STM / Oscillations-Mobs:  G-I, II, III, IV (PA's, Inf., Post.)  [x] (61878) Provided manual therapy to mobilize soft tissue/joints of cervical/CT, scapular GHJ and UE for the purpose of modulating pain, promoting relaxation,  increasing ROM, reducing/eliminating soft tissue swelling/inflammation/restriction, improving soft tissue extensibility and allowing for proper ROM for normal function with self care, reaching, carrying, lifting, house/yardwork, driving/computer work    Modalities: declines modalities    Charges:  Timed Code Treatment Minutes: 35   Total Treatment Minutes: 60 (rest breaks, bike,

## 2019-07-10 ENCOUNTER — APPOINTMENT (OUTPATIENT)
Dept: PHYSICAL THERAPY | Age: 19
End: 2019-07-10
Payer: COMMERCIAL

## 2019-07-12 ENCOUNTER — HOSPITAL ENCOUNTER (OUTPATIENT)
Dept: PHYSICAL THERAPY | Age: 19
Setting detail: THERAPIES SERIES
Discharge: HOME | End: 2019-07-12
Payer: COMMERCIAL

## 2019-07-12 PROCEDURE — 97140 MANUAL THERAPY 1/> REGIONS: CPT

## 2019-07-15 ENCOUNTER — APPOINTMENT (OUTPATIENT)
Dept: PHYSICAL THERAPY | Age: 19
End: 2019-07-15
Payer: COMMERCIAL

## 2019-08-25 ENCOUNTER — NURSE ONLY (OUTPATIENT)
Dept: CARDIOLOGY CLINIC | Age: 19
End: 2019-08-25
Payer: COMMERCIAL

## 2019-08-25 DIAGNOSIS — Z02.5 SPORTS PHYSICAL: Primary | ICD-10-CM

## 2019-08-25 PROCEDURE — 93000 ELECTROCARDIOGRAM COMPLETE: CPT | Performed by: INTERNAL MEDICINE

## 2019-10-07 ENCOUNTER — HOSPITAL ENCOUNTER (OUTPATIENT)
Dept: PHYSICAL THERAPY | Age: 19
Setting detail: THERAPIES SERIES
Discharge: HOME | End: 2019-10-07
Payer: COMMERCIAL

## 2019-10-07 PROCEDURE — 97032 APPL MODALITY 1+ESTIM EA 15: CPT

## 2019-10-07 PROCEDURE — 97161 PT EVAL LOW COMPLEX 20 MIN: CPT

## 2019-10-07 PROCEDURE — 97140 MANUAL THERAPY 1/> REGIONS: CPT

## 2019-10-09 ENCOUNTER — HOSPITAL ENCOUNTER (OUTPATIENT)
Dept: PHYSICAL THERAPY | Age: 19
Setting detail: THERAPIES SERIES
Discharge: HOME | End: 2019-10-09
Payer: COMMERCIAL

## 2019-10-14 ENCOUNTER — HOSPITAL ENCOUNTER (OUTPATIENT)
Dept: PHYSICAL THERAPY | Age: 19
Setting detail: THERAPIES SERIES
Discharge: HOME | End: 2019-10-14
Payer: COMMERCIAL

## 2019-10-14 PROCEDURE — 97110 THERAPEUTIC EXERCISES: CPT

## 2019-10-14 PROCEDURE — 97140 MANUAL THERAPY 1/> REGIONS: CPT

## 2019-10-14 PROCEDURE — 97032 APPL MODALITY 1+ESTIM EA 15: CPT

## 2019-10-18 ENCOUNTER — HOSPITAL ENCOUNTER (OUTPATIENT)
Dept: PHYSICAL THERAPY | Age: 19
Setting detail: THERAPIES SERIES
Discharge: HOME | End: 2019-10-18
Payer: COMMERCIAL

## 2019-10-18 PROCEDURE — 97032 APPL MODALITY 1+ESTIM EA 15: CPT

## 2019-10-18 PROCEDURE — 97140 MANUAL THERAPY 1/> REGIONS: CPT

## 2019-10-18 PROCEDURE — 97110 THERAPEUTIC EXERCISES: CPT

## 2019-10-21 ENCOUNTER — HOSPITAL ENCOUNTER (OUTPATIENT)
Dept: PHYSICAL THERAPY | Age: 19
Setting detail: THERAPIES SERIES
Discharge: HOME | End: 2019-10-21
Payer: COMMERCIAL

## 2019-10-21 PROCEDURE — 97110 THERAPEUTIC EXERCISES: CPT

## 2019-10-21 PROCEDURE — 97140 MANUAL THERAPY 1/> REGIONS: CPT

## 2019-10-25 ENCOUNTER — HOSPITAL ENCOUNTER (OUTPATIENT)
Dept: PHYSICAL THERAPY | Age: 19
Setting detail: THERAPIES SERIES
Discharge: HOME | End: 2019-10-25
Payer: COMMERCIAL

## 2019-10-25 PROCEDURE — 97140 MANUAL THERAPY 1/> REGIONS: CPT

## 2019-10-30 ENCOUNTER — HOSPITAL ENCOUNTER (OUTPATIENT)
Dept: PHYSICAL THERAPY | Age: 19
Setting detail: THERAPIES SERIES
Discharge: HOME | End: 2019-10-30
Payer: COMMERCIAL

## 2019-10-30 PROCEDURE — 97140 MANUAL THERAPY 1/> REGIONS: CPT

## 2019-10-30 PROCEDURE — 97110 THERAPEUTIC EXERCISES: CPT

## 2019-11-01 ENCOUNTER — HOSPITAL ENCOUNTER (OUTPATIENT)
Dept: PHYSICAL THERAPY | Age: 19
Setting detail: THERAPIES SERIES
Discharge: HOME | End: 2019-11-01
Payer: COMMERCIAL

## 2019-11-01 PROCEDURE — 97032 APPL MODALITY 1+ESTIM EA 15: CPT

## 2019-11-01 PROCEDURE — 97110 THERAPEUTIC EXERCISES: CPT

## 2019-11-01 PROCEDURE — 97140 MANUAL THERAPY 1/> REGIONS: CPT

## 2019-11-04 ENCOUNTER — HOSPITAL ENCOUNTER (OUTPATIENT)
Dept: PHYSICAL THERAPY | Age: 19
Setting detail: THERAPIES SERIES
Discharge: HOME | End: 2019-11-04
Payer: COMMERCIAL

## 2019-11-04 PROCEDURE — 97140 MANUAL THERAPY 1/> REGIONS: CPT

## 2019-11-04 PROCEDURE — 97110 THERAPEUTIC EXERCISES: CPT

## 2019-11-04 PROCEDURE — 97112 NEUROMUSCULAR REEDUCATION: CPT

## 2019-11-08 ENCOUNTER — HOSPITAL ENCOUNTER (OUTPATIENT)
Dept: PHYSICAL THERAPY | Age: 19
Setting detail: THERAPIES SERIES
Discharge: HOME | End: 2019-11-08
Payer: COMMERCIAL

## 2019-11-08 PROCEDURE — 97110 THERAPEUTIC EXERCISES: CPT

## 2019-11-08 PROCEDURE — 97140 MANUAL THERAPY 1/> REGIONS: CPT

## 2019-11-08 PROCEDURE — 97112 NEUROMUSCULAR REEDUCATION: CPT

## 2019-11-11 ENCOUNTER — HOSPITAL ENCOUNTER (OUTPATIENT)
Dept: PHYSICAL THERAPY | Age: 19
Setting detail: THERAPIES SERIES
Discharge: HOME | End: 2019-11-11
Payer: COMMERCIAL

## 2019-11-15 ENCOUNTER — HOSPITAL ENCOUNTER (OUTPATIENT)
Dept: PHYSICAL THERAPY | Age: 19
Setting detail: THERAPIES SERIES
Discharge: HOME | End: 2019-11-15
Payer: COMMERCIAL

## 2019-11-15 PROCEDURE — 97112 NEUROMUSCULAR REEDUCATION: CPT

## 2019-11-15 PROCEDURE — 97110 THERAPEUTIC EXERCISES: CPT

## 2019-11-15 PROCEDURE — 97140 MANUAL THERAPY 1/> REGIONS: CPT

## 2019-11-20 ENCOUNTER — HOSPITAL ENCOUNTER (OUTPATIENT)
Dept: PHYSICAL THERAPY | Age: 19
Setting detail: THERAPIES SERIES
Discharge: HOME | End: 2019-11-20
Payer: COMMERCIAL

## 2019-11-20 PROCEDURE — 97530 THERAPEUTIC ACTIVITIES: CPT

## 2019-11-20 PROCEDURE — 97110 THERAPEUTIC EXERCISES: CPT

## 2019-11-20 PROCEDURE — 97140 MANUAL THERAPY 1/> REGIONS: CPT

## 2019-12-04 ENCOUNTER — HOSPITAL ENCOUNTER (OUTPATIENT)
Dept: PHYSICAL THERAPY | Age: 19
Setting detail: THERAPIES SERIES
Discharge: HOME | End: 2019-12-04
Payer: COMMERCIAL

## 2020-07-31 ENCOUNTER — HOSPITAL ENCOUNTER (OUTPATIENT)
Dept: PHYSICAL THERAPY | Age: 20
Setting detail: THERAPIES SERIES
Discharge: HOME | End: 2020-07-31
Payer: COMMERCIAL

## 2020-07-31 PROCEDURE — L3020 FOOT LONGITUD/METATARSAL SUP: HCPCS

## 2020-07-31 NOTE — PLAN OF CARE
Jaren Borden  Phone: [phone]   Fax:     [phone]                                                       Birgit Gonzalez    Dear Dr. Claire Abebe  ,    We had the pleasure of evaluating the following patient for physical therapy services at [address] [place] [address]. A summary of our findings can be found in the initial assessment below. This includes our plan of care. If you have any questions or concerns regarding these findings, please do not hesitate to contact me at the office phone number checked above. Thank you for the referral.       Physician Signature:_______________________________Date:__________________  By signing above (or electronic signature), therapists plan is approved by physician      Patient: Mitch Kern   : 2000   MRN: [id number]  Referring Physician: Referring Practitioner: Dr Claire Abebe                                                 Evaluation Date: 2018                                           Medical Diagnosis Information:  Diagnosis: M79.671, M55.445 (ICD-10-CM) - Foot pain, bilateral S92.811A (ICD-10-CM) - Closed fracture of sesamoid bone of right foot, initial encounter     Treatment Diagnosis: M79.671, M79.672 (ICD-10-CM) - Foot pain, bilateral S92.811A (ICD-10-CM) - Closed fracture of sesamoid bone of right foot, initial encounter                                         Insurance information:       Precautions/ Contra-indications:   Latex Allergy:  [x]NO      []YES  Preferred Language for Healthcare:   [x]English       []other:    SUBJECTIVE: Patient presents to clinic stating that his orthotic worked great for fractured sesamoid that we made back in 2018.  He states that he had no foot pain once he started wearing them and that he felt great however, they have worn down and he is in need of a new pair. Mileage/week: sprints  Orthotic/shoe currently worn: miguelito    Relevant Medical History: sesamoid fracture    Pain Scale: 0/10  Easing factors: rest  Provocative factors:  walking, running   Type: []Constant   [x]Intermittent  []Radiating []Localized []other:  Numbness/Tingling:     Functional Limitations/Impairments: []Sitting []Standing [x]Walking /Running     []Squatting/bending  []Stairs           []ADL's  []Transfers [x]Sports/Recreation []Other:    Occupation/School:student     Living Status/Prior Level of Function: Independent with ADLs and IADLs, collegiate baseball    OBJECTIVE:       LEFT RIGHT   Rearfoot position neutral neutral   Forefoot position varus varus   1st Ray position neutral PF   1st Ray mobility Semi-rigid Semi-rigid   Calcaneal position standing 2 -3   Calcaneal position squatting  5 0   Total pronation        ROM LEFT RIGHT   HIP Flex     HIP Abd     HIP Ext     HIP IR     HIP ER     Knee ext     Knee Flex     Dorsiflexion- straight 0-5 0-5   Dorsiflexion- bent limited limited             Strength  LEFT RIGHT   HIP Flexors     HIP Abductors 5/5 4+/5   HIP Ext Good GM Good GM   Hip ER     Knee EXT (quad)     Knee Flex (HS)     Ankle DF     Ankle PF     Ankle Inv     Ankle EV       Functional/Non-functional - Painful  1. Deep Squat: functional/non-painful  2. SLS- eyes open: functional/non-painful  3. SLS- eyes closed: LOB right    Gait: (include devices/WB status)      Reflexes/Sensation:    [x]Dermatomes/Myotomes intact    [x]Reflexes equal and normal bilaterally   []Other:    Joint mobility(Rearfoot/1st ray/G. Toe) : great toe   []Normal    [x]Hypo   []Hyper    Palpation: no real tenderness to palpate                         [x] Patient history, allergies, meds reviewed. Medical chart reviewed. See intake form. Review Of Systems (ROS):  [x]Performed Review of systems (Integumentary, CardioPulmonary, Neurological) by intake and observation.  Intake form has been scanned into medical record. Patient has been instructed to contact their primary care physician regarding ROS issues if not already being addressed at this time. Co-morbidities/Complexities (which will affect course of rehabilitation):   [x]None           Arthritic conditions   []Rheumatoid arthritis (M05.9)  []Osteoarthritis (M19.91)   Cardiovascular conditions   []Hypertension (I10)  []Hyperlipidemia (E78.5)  []Angina pectoris (I20)  []Atherosclerosis (I70)   Musculoskeletal conditions   []Disc pathology   []Congenital spine pathologies   []Prior surgical intervention  []Osteoporosis (M81.8)  []Osteopenia (M85.8)   Endocrine conditions   []Hypothyroid (E03.9)  []Hyperthyroid Gastrointestinal conditions   []Constipation (R43.41)   Metabolic conditions   []Morbid obesity (E66.01)  []Diabetes type 1(E10.65) or 2 (E11.65)   []Neuropathy (G60.9)     Pulmonary conditions   []Asthma (J45)  []Coughing   []COPD (J44.9)   Psychological Disorders  []Anxiety (F41.9)  []Depression (F32.9)   []Other:   []Other:        Barriers to/and or personal factors that will affect rehab potential:              []Age  []Sex              []Motivation/Lack of Motivation                        []Co-Morbidities              []Cognitive Function, education/learning barriers              []Environmental, home barriers              []profession/work barriers  []past PT/medical experience  []other:  Justification:     Falls Risk Assessment (30 days):   [x] Falls Risk assessed and no intervention required. [] Falls Risk assessed and Patient requires intervention due to being higher risk   TUG score (>12s at risk):     [] Falls education provided, including       G-Codes:       ASSESSMENT: Patient presents with broken down orthotic device which needs replacing in order to avoid damage to foot or ankle. He has some limitations in foot and ankle mobility which should be addressed with ATC.       Functional deficiencies/Impairments: []Decreased core/proximal hip strength and neuromuscular control -Reduced overall functional level with mobility and lifting    [x]Noted foot/ankle hypomobility - Reduced overall functional level with mobility and gait    []Decreased LE functional strength- Reduced overall functional mobility   []Reduced balance/proprioceptive control- Reduced overall functional level with mobility and gait, possible falls risk   [x]Pain/difficulty with ambulation- Reduced overall functional mobility   [x]Unable to perform sport/recreational activity due to pain / dysfunction   [x]Foot biomechanics requiring correction:compensated forefoot varus, hallux valgus, limited great toe motion for propulsion    Classification :    [x]Signs/symptoms consistent with foot mechanics/gait dysfunction which would benefit from custom orthotic device fabrication    []Signs/symptoms consistent with functional hip weakness/NMR control      []Signs/symptoms consistent with tendinitis/tendinosis    [x]signs/symptoms consistent with pathology which requires additional therapeutic intervention in addition to custom orthotics       []other:      Prognosis/Rehab Potential:      [x]Excellent   []Good    []Fair   []Poor    Physical Therapy Evaluation Complexity Justification  [x] A history of present problem with:  [x] no personal factors and/or comorbidities that impact the plan of care;  []1-2 personal factors and/or comorbidities that impact the plan of care  []3 personal factors and/or comorbidities that impact the plan of care  [x] An examination of body systems using standardized tests and measures addressing any of the following: body structures and functions (impairments), activity limitations, and/or participation restrictions;:  [x] a total of 1-2 or more elements   [] a total of 3 or more elements   [] a total of 4 or more elements   [x] A clinical presentation with:  [x] stable and/or uncomplicated characteristics   [] evolving clinical presentation with changing characteristics  [] unstable and unpredictable characteristics;   [x] Clinical decision making of [x] low, [] moderate, [] high complexity using standardized patient assessment instrument and/or measurable assessment of functional outcome. [] EVAL (LOW) 29004 (typically 20 minutes face-to-face)  [] EVAL (MOD) 18246 (typically 30 minutes face-to-face)  [] EVAL (HIGH) 04145 (typically 45 minutes face-to-face)  [] RE-EVAL      PLAN:  Frequency/Duration: continue with formal therapy per primary PT  Interventions:  [x] Initiate fabrication of custom orthotic device. HEP instruction: Patient instructed in LE flexibility exercise and care of custom orthotics    GOALS:  Patient stated goal: pain free baseball    Therapist goals for Patient:   Short Term Goals: To be achieved in:   1. Patient to demonstrate independence in wear and care for custom orthotic device.           Electronically signed by:  Devora Tam, PT